# Patient Record
Sex: FEMALE | Race: WHITE | NOT HISPANIC OR LATINO | Employment: OTHER | URBAN - METROPOLITAN AREA
[De-identification: names, ages, dates, MRNs, and addresses within clinical notes are randomized per-mention and may not be internally consistent; named-entity substitution may affect disease eponyms.]

---

## 2022-04-11 ENCOUNTER — HISTORICAL (OUTPATIENT)
Dept: ADMINISTRATIVE | Facility: HOSPITAL | Age: 87
End: 2022-04-11
Payer: MEDICARE

## 2022-04-29 VITALS
DIASTOLIC BLOOD PRESSURE: 89 MMHG | WEIGHT: 148.13 LBS | SYSTOLIC BLOOD PRESSURE: 146 MMHG | BODY MASS INDEX: 25.29 KG/M2 | HEIGHT: 64 IN

## 2022-06-09 DIAGNOSIS — D48.5 NEOPLASM OF UNCERTAIN BEHAVIOR OF SKIN: Primary | ICD-10-CM

## 2022-06-28 DIAGNOSIS — R41.3 MEMORY LOSS: Primary | ICD-10-CM

## 2022-06-28 RX ORDER — DONEPEZIL HYDROCHLORIDE 5 MG/1
5 TABLET, FILM COATED ORAL 2 TIMES DAILY
COMMUNITY
Start: 2021-06-28 | End: 2022-06-28 | Stop reason: SDUPTHER

## 2022-06-28 RX ORDER — DONEPEZIL HYDROCHLORIDE 5 MG/1
5 TABLET, FILM COATED ORAL 2 TIMES DAILY
Qty: 60 TABLET | Refills: 5 | Status: SHIPPED | OUTPATIENT
Start: 2022-06-28 | End: 2023-04-17 | Stop reason: SDUPTHER

## 2022-06-29 NOTE — PROGRESS NOTES
Subjective:       Patient ID: Darby Chavez is a 87 y.o. female.    Chief Complaint: Consult (NPO--Derm Ca-Met Breast cancer)      Diagnosis:  1.  Right-sided breast cancer in the late 1990s/early 2000s status post mastectomy.  Patient unsure of surgeon's name, unsure of receptor status, did not receive chemotherapy or radiation.  2. Breast cancer of the left breast 2 years after initial right breast cancer, also status post mastectomy with no chemo or radiation, details unknown.    3.  Metastatic breast cancer of the skin at the site of her left mastectomy scar diagnosed 06/01/2022    Current Treatment:   1. Pending workup    Treatment History:  1. Right mastectomy in the late 1990s/early 2000s, left mastectomy 2 years later.    HPI  87-year-old female who was diagnosed with right-sided breast cancer in the late 1990s/early 2000s, status post mastectomy of the right breast.  She did not receive any chemo or radiation.  Two years later, she was diagnosed with left-sided breast cancer, also underwent left-sided mastectomy without chemotherapy or radiation.  In 2022, she noticed a nodule/rash at the site of her left mastectomy scar.  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  He was referred Oncology.  I saw her on 07/05/2022.  At that visit, she stated that she has oozing at the site of her metastatic cancer.  She stated that it does not hurt, in only causes problems when she hits it.    Interval History:   Initial consult    Past Medical History:   Diagnosis Date    Anxiety     Arthritis     Breast cancer     CAD (coronary artery disease)     Hyperlipidemia     Hypertension     Mild cognitive impairment     Restless legs syndrome       Past Surgical History:   Procedure Laterality Date    CATARACT EXTRACTION      COLONOSCOPY      CORONARY STENT PLACEMENT      HYSTERECTOMY       MASTECTOMY Bilateral     TONSILLECTOMY      UTERINE SUSPENSION      WRIST SURGERY Right      Social History     Socioeconomic History    Marital status:    Occupational History    Occupation: Retired   Tobacco Use    Smoking status: Former Smoker     Types: Cigarettes    Smokeless tobacco: Never Used   Substance and Sexual Activity    Alcohol use: Not Currently    Drug use: Never      Family History   Problem Relation Age of Onset    Breast cancer Maternal Aunt     Breast cancer Maternal Aunt       Review of patient's allergies indicates:   Allergen Reactions    Sulfa (sulfonamide antibiotics)      Other reaction(s): itching      Review of Systems   Constitutional: Negative for appetite change and unexpected weight change.   HENT: Negative for mouth sores.    Eyes: Negative for visual disturbance.   Respiratory: Negative for cough and shortness of breath.    Cardiovascular: Negative for chest pain.   Gastrointestinal: Negative for abdominal pain and diarrhea.   Genitourinary: Negative for frequency.   Musculoskeletal: Negative for back pain.   Integumentary:  Negative for rash.   Neurological: Negative for headaches.   Hematological: Negative for adenopathy.   Psychiatric/Behavioral: The patient is not nervous/anxious.          Objective:      Physical Exam  Vitals reviewed. Exam conducted with a chaperone present.   Constitutional:       General: She is awake.      Appearance: Normal appearance. She is well-developed.   HENT:      Head: Normocephalic and atraumatic.   Eyes:      General: Lids are normal. Vision grossly intact.      Extraocular Movements: Extraocular movements intact.      Conjunctiva/sclera: Conjunctivae normal.   Cardiovascular:      Rate and Rhythm: Normal rate and regular rhythm.      Pulses: Normal pulses.      Heart sounds: Normal heart sounds.   Pulmonary:      Effort: Pulmonary effort is normal.      Breath sounds: Normal breath sounds.   Chest:      Chest wall: Mass  (Left mastectomy scar with nodule eroding through skin and some oozing consistent with patient's biopsy-proven cancer) present.   Breasts:      Right: No axillary adenopathy or supraclavicular adenopathy.      Left: Skin change present. No supraclavicular adenopathy.       Abdominal:      General: Bowel sounds are normal. There is no distension.      Palpations: Abdomen is soft.      Tenderness: There is no abdominal tenderness.   Musculoskeletal:      Cervical back: Full passive range of motion without pain.      Right lower leg: No edema.      Left lower leg: No edema.   Lymphadenopathy:      Cervical: No cervical adenopathy.      Upper Body:      Right upper body: No supraclavicular, axillary or pectoral adenopathy.      Left upper body: No supraclavicular or pectoral adenopathy.   Skin:     General: Skin is warm and dry.   Neurological:      General: No focal deficit present.      Mental Status: She is alert and oriented to person, place, and time.   Psychiatric:         Attention and Perception: Attention and perception normal.         Behavior: Behavior is cooperative.         LABS AND IMAGING REVIEWED IN EPIC          Assessment:   1. Right-sided breast cancer in the late 1990s/early 2000s status post mastectomy.  Patient unsure of surgeon's name, unsure of receptor status, did not receive chemotherapy or radiation.  2. Breast cancer of the left breast 2 years after initial right breast cancer, also status post mastectomy with no chemo or radiation, details unknown.    3. Metastatic breast cancer of the skin at the site of her left mastectomy scar diagnosed 06/01/2022        Plan:       Patient with history of right-sided breast cancer in the late 1990s/early 2000s with left-sided breast cancer that occurred 2 years later.  She now has recurrence at the site of her left mastectomy scar.    We will send her biopsy specimen for ER/WA and HER2 receptor status.    We will get a PET/CT scan to determine if this is  local recurrence or if she also has diffuse metastatic disease.    The patient is reluctant for treatment, but I explained that if she has HER2 positive disease or hormone receptor positive disease, she may be a candidate for non chemotherapy based treatment.      Also explained that if this is local, she may be a candidate for radiation.    We will have her get a PET/CT scan on return to clinic in 4 weeks.    Kendell Bhatt II, MD

## 2022-07-05 ENCOUNTER — OFFICE VISIT (OUTPATIENT)
Dept: HEMATOLOGY/ONCOLOGY | Facility: CLINIC | Age: 87
End: 2022-07-05
Payer: MEDICARE

## 2022-07-05 VITALS
DIASTOLIC BLOOD PRESSURE: 80 MMHG | HEIGHT: 64 IN | BODY MASS INDEX: 24.84 KG/M2 | WEIGHT: 145.5 LBS | RESPIRATION RATE: 18 BRPM | OXYGEN SATURATION: 98 % | TEMPERATURE: 99 F | SYSTOLIC BLOOD PRESSURE: 146 MMHG | HEART RATE: 83 BPM

## 2022-07-05 DIAGNOSIS — D48.5 NEOPLASM OF UNCERTAIN BEHAVIOR OF SKIN: ICD-10-CM

## 2022-07-05 PROCEDURE — 1101F PR PT FALLS ASSESS DOC 0-1 FALLS W/OUT INJ PAST YR: ICD-10-PCS | Mod: CPTII,S$GLB,, | Performed by: INTERNAL MEDICINE

## 2022-07-05 PROCEDURE — 1159F MED LIST DOCD IN RCRD: CPT | Mod: CPTII,S$GLB,, | Performed by: INTERNAL MEDICINE

## 2022-07-05 PROCEDURE — 3288F FALL RISK ASSESSMENT DOCD: CPT | Mod: CPTII,S$GLB,, | Performed by: INTERNAL MEDICINE

## 2022-07-05 PROCEDURE — 1101F PT FALLS ASSESS-DOCD LE1/YR: CPT | Mod: CPTII,S$GLB,, | Performed by: INTERNAL MEDICINE

## 2022-07-05 PROCEDURE — 99205 OFFICE O/P NEW HI 60 MIN: CPT | Mod: S$GLB,,, | Performed by: INTERNAL MEDICINE

## 2022-07-05 PROCEDURE — 3288F PR FALLS RISK ASSESSMENT DOCUMENTED: ICD-10-PCS | Mod: CPTII,S$GLB,, | Performed by: INTERNAL MEDICINE

## 2022-07-05 PROCEDURE — 1160F RVW MEDS BY RX/DR IN RCRD: CPT | Mod: CPTII,S$GLB,, | Performed by: INTERNAL MEDICINE

## 2022-07-05 PROCEDURE — 1126F PR PAIN SEVERITY QUANTIFIED, NO PAIN PRESENT: ICD-10-PCS | Mod: CPTII,S$GLB,, | Performed by: INTERNAL MEDICINE

## 2022-07-05 PROCEDURE — 99205 PR OFFICE/OUTPT VISIT, NEW, LEVL V, 60-74 MIN: ICD-10-PCS | Mod: S$GLB,,, | Performed by: INTERNAL MEDICINE

## 2022-07-05 PROCEDURE — 99999 PR PBB SHADOW E&M-EST. PATIENT-LVL V: CPT | Mod: PBBFAC,,, | Performed by: INTERNAL MEDICINE

## 2022-07-05 PROCEDURE — 1126F AMNT PAIN NOTED NONE PRSNT: CPT | Mod: CPTII,S$GLB,, | Performed by: INTERNAL MEDICINE

## 2022-07-05 PROCEDURE — 99999 PR PBB SHADOW E&M-EST. PATIENT-LVL V: ICD-10-PCS | Mod: PBBFAC,,, | Performed by: INTERNAL MEDICINE

## 2022-07-05 PROCEDURE — 1159F PR MEDICATION LIST DOCUMENTED IN MEDICAL RECORD: ICD-10-PCS | Mod: CPTII,S$GLB,, | Performed by: INTERNAL MEDICINE

## 2022-07-05 PROCEDURE — 1160F PR REVIEW ALL MEDS BY PRESCRIBER/CLIN PHARMACIST DOCUMENTED: ICD-10-PCS | Mod: CPTII,S$GLB,, | Performed by: INTERNAL MEDICINE

## 2022-07-05 RX ORDER — MONTELUKAST SODIUM 10 MG/1
10 TABLET ORAL DAILY
COMMUNITY
Start: 2022-06-27

## 2022-07-05 RX ORDER — TOLTERODINE TARTRATE 1 MG/1
1 TABLET, EXTENDED RELEASE ORAL 2 TIMES DAILY
COMMUNITY

## 2022-07-05 RX ORDER — PRAVASTATIN SODIUM 20 MG/1
20 TABLET ORAL DAILY
COMMUNITY
Start: 2022-04-27

## 2022-07-05 RX ORDER — ASPIRIN 81 MG/1
81 TABLET ORAL DAILY
COMMUNITY

## 2022-07-05 RX ORDER — OLOPATADINE HYDROCHLORIDE 665 UG/1
SPRAY NASAL
COMMUNITY
Start: 2022-06-02 | End: 2022-08-18

## 2022-07-05 RX ORDER — BETAMETHASONE DIPROPIONATE 0.5 MG/G
1 CREAM TOPICAL 2 TIMES DAILY
COMMUNITY
Start: 2022-05-26 | End: 2022-12-21

## 2022-07-05 RX ORDER — LEVOCETIRIZINE DIHYDROCHLORIDE 5 MG/1
5 TABLET, FILM COATED ORAL DAILY PRN
COMMUNITY
Start: 2022-05-13

## 2022-07-05 RX ORDER — NITROGLYCERIN 0.4 MG/1
TABLET SUBLINGUAL
COMMUNITY
Start: 2022-03-07

## 2022-07-05 RX ORDER — LOSARTAN POTASSIUM 25 MG/1
25 TABLET ORAL DAILY
COMMUNITY
Start: 2022-05-31

## 2022-07-05 RX ORDER — ESOMEPRAZOLE MAGNESIUM 40 MG/1
40 CAPSULE, DELAYED RELEASE ORAL DAILY
COMMUNITY
Start: 2022-05-13

## 2022-07-05 RX ORDER — VENLAFAXINE HYDROCHLORIDE 37.5 MG/1
37.5 CAPSULE, EXTENDED RELEASE ORAL DAILY
COMMUNITY
Start: 2022-04-27

## 2022-07-07 ENCOUNTER — PATIENT MESSAGE (OUTPATIENT)
Dept: HEMATOLOGY/ONCOLOGY | Facility: CLINIC | Age: 87
End: 2022-07-07
Payer: MEDICARE

## 2022-07-08 ENCOUNTER — PATIENT MESSAGE (OUTPATIENT)
Dept: HEMATOLOGY/ONCOLOGY | Facility: CLINIC | Age: 87
End: 2022-07-08
Payer: MEDICARE

## 2022-08-08 ENCOUNTER — HOSPITAL ENCOUNTER (OUTPATIENT)
Dept: RADIOLOGY | Facility: HOSPITAL | Age: 87
Discharge: HOME | End: 2022-08-08
Attending: INTERNAL MEDICINE
Payer: MEDICARE

## 2022-08-08 DIAGNOSIS — D48.5 NEOPLASM OF UNCERTAIN BEHAVIOR OF SKIN: ICD-10-CM

## 2022-08-08 PROCEDURE — 78815 PET IMAGE W/CT SKULL-THIGH: CPT | Mod: PI,TC

## 2022-08-15 ENCOUNTER — OFFICE VISIT (OUTPATIENT)
Dept: NEUROLOGY | Facility: CLINIC | Age: 87
End: 2022-08-15
Payer: MEDICARE

## 2022-08-15 VITALS
HEIGHT: 64 IN | SYSTOLIC BLOOD PRESSURE: 124 MMHG | BODY MASS INDEX: 24.75 KG/M2 | HEART RATE: 84 BPM | WEIGHT: 145 LBS | DIASTOLIC BLOOD PRESSURE: 82 MMHG

## 2022-08-15 DIAGNOSIS — G31.84 MILD COGNITIVE IMPAIRMENT: Primary | ICD-10-CM

## 2022-08-15 PROCEDURE — 1126F PR PAIN SEVERITY QUANTIFIED, NO PAIN PRESENT: ICD-10-PCS | Mod: CPTII,S$GLB,, | Performed by: PSYCHIATRY & NEUROLOGY

## 2022-08-15 PROCEDURE — 99214 PR OFFICE/OUTPT VISIT, EST, LEVL IV, 30-39 MIN: ICD-10-PCS | Mod: S$GLB,,, | Performed by: PSYCHIATRY & NEUROLOGY

## 2022-08-15 PROCEDURE — 99999 PR PBB SHADOW E&M-EST. PATIENT-LVL IV: ICD-10-PCS | Mod: PBBFAC,,, | Performed by: PSYCHIATRY & NEUROLOGY

## 2022-08-15 PROCEDURE — 1159F PR MEDICATION LIST DOCUMENTED IN MEDICAL RECORD: ICD-10-PCS | Mod: CPTII,S$GLB,, | Performed by: PSYCHIATRY & NEUROLOGY

## 2022-08-15 PROCEDURE — 99999 PR PBB SHADOW E&M-EST. PATIENT-LVL IV: CPT | Mod: PBBFAC,,, | Performed by: PSYCHIATRY & NEUROLOGY

## 2022-08-15 PROCEDURE — 1101F PT FALLS ASSESS-DOCD LE1/YR: CPT | Mod: CPTII,S$GLB,, | Performed by: PSYCHIATRY & NEUROLOGY

## 2022-08-15 PROCEDURE — 3288F PR FALLS RISK ASSESSMENT DOCUMENTED: ICD-10-PCS | Mod: CPTII,S$GLB,, | Performed by: PSYCHIATRY & NEUROLOGY

## 2022-08-15 PROCEDURE — 1126F AMNT PAIN NOTED NONE PRSNT: CPT | Mod: CPTII,S$GLB,, | Performed by: PSYCHIATRY & NEUROLOGY

## 2022-08-15 PROCEDURE — 1159F MED LIST DOCD IN RCRD: CPT | Mod: CPTII,S$GLB,, | Performed by: PSYCHIATRY & NEUROLOGY

## 2022-08-15 PROCEDURE — 3288F FALL RISK ASSESSMENT DOCD: CPT | Mod: CPTII,S$GLB,, | Performed by: PSYCHIATRY & NEUROLOGY

## 2022-08-15 PROCEDURE — 99214 OFFICE O/P EST MOD 30 MIN: CPT | Mod: S$GLB,,, | Performed by: PSYCHIATRY & NEUROLOGY

## 2022-08-15 PROCEDURE — 1101F PR PT FALLS ASSESS DOC 0-1 FALLS W/OUT INJ PAST YR: ICD-10-PCS | Mod: CPTII,S$GLB,, | Performed by: PSYCHIATRY & NEUROLOGY

## 2022-08-15 RX ORDER — ASCORBIC ACID 250 MG
250 TABLET ORAL DAILY
COMMUNITY

## 2022-08-15 RX ORDER — VIT C/E/ZN/COPPR/LUTEIN/ZEAXAN 250MG-90MG
1 CAPSULE ORAL DAILY
COMMUNITY

## 2022-08-15 RX ORDER — CALCIUM CARBONATE 500(1250)
1 TABLET ORAL DAILY
COMMUNITY

## 2022-08-15 RX ORDER — CHOLECALCIFEROL (VITAMIN D3) 25 MCG
1000 TABLET ORAL DAILY
COMMUNITY

## 2022-08-15 RX ORDER — LANOLIN ALCOHOL/MO/W.PET/CERES
100 CREAM (GRAM) TOPICAL EVERY OTHER DAY
COMMUNITY

## 2022-08-15 NOTE — PROGRESS NOTES
Chief Complaint   Patient presents with    Mild cognitive impairment     States for her age her memory is pretty good. At times she misplaces things. On an average sleeps at least 6 hours at night. Denies any frustration or agitation.        This is a 87 y.o. female here for follow up for MCI, Here with daughter. She is doing well. She gets out of house twice a week with her son. Other than that she does watch TV most days. No longer driving. On aricept 5 BID and toerlating well. Denies any recent illnesses or infections.         Medication List with Changes/Refills   Current Medications    ASCORBIC ACID, VITAMIN C, (VITAMIN C) 250 MG TABLET    Take 250 mg by mouth once daily.    ASPIRIN (ECOTRIN) 81 MG EC TABLET    Take 81 mg by mouth once daily.    AUGMENTED BETAMETHASONE DIPROPIONATE (DIPROLENE-AF) 0.05 % CREAM    Apply 1 application topically 2 (two) times daily.    CALCIUM CARBONATE (OS-KAREN) 500 MG CALCIUM (1,250 MG) TABLET    Take 1 tablet by mouth once daily.    CYANOCOBALAMIN (VITAMIN B-12) 1000 MCG TABLET    Take 100 mcg by mouth every other day.    DONEPEZIL (ARICEPT) 5 MG TABLET    Take 1 tablet (5 mg total) by mouth 2 (two) times a day.    ESOMEPRAZOLE (NEXIUM) 40 MG CAPSULE    Take 40 mg by mouth once daily.    LEVOCETIRIZINE (XYZAL) 5 MG TABLET    Take 5 mg by mouth daily as needed.    LOSARTAN (COZAAR) 25 MG TABLET    Take 25 mg by mouth once daily.    MONTELUKAST (SINGULAIR) 10 MG TABLET    Take 10 mg by mouth once daily.    MULTIVITAMIN/IRON/FOLIC ACID (CENTRUM COMPLETE ORAL)    Take 1 tablet by mouth Daily.    NITROGLYCERIN (NITROSTAT) 0.4 MG SL TABLET    Place under the tongue.    OLOPATADINE (PATANASE) 0.6 % NASAL SPRAY    by Each Nostril route.    PRAVASTATIN (PRAVACHOL) 20 MG TABLET    Take 20 mg by mouth once daily.    TOLTERODINE (DETROL) 1 MG TAB    Take 1 mg by mouth 2 (two) times daily.    VENLAFAXINE (EFFEXOR-XR) 37.5 MG 24 HR CAPSULE    Take 37.5 mg by mouth once daily.    VIT  C,O-[id number]-LUTEIN-ZEAXAN (PRESERVISION AREDS-2) 250-90-40-1 MG CAP    Take 1 capsule by mouth once daily at 6am.    VITAMIN D (VITAMIN D3) 1000 UNITS TAB    Take 1,000 Units by mouth once daily.        Vitals:    08/15/22 0847   BP: 124/82   Pulse: 84        General: alert and oriented, no acute distress, no audible wheezes, pulse intact, no edema    Cognition and Comprehension  Speech and language intact  Follows commands  Speech fluent  Attention intact  Memory for recent events intact from history taking  Affect pleasant  Fund of knowledge adequate    MOCA 22/30 (21/30)    Cranial nerves  II. Optic: Visual fields full to confrontation both eyes  III, IV, VI. Oculomotor: Intact, Pupils equal, round and reactive to light, no nystagmus  V. Trigeminal: sensation to light touch normal  VII. No facial asymmetry or facial weakness  VIII. Hearing intact to spoken voice  IX/X. Glossopharyngeal/Vagus: Voice normal, palate rises symmetrically  XI. Axillary: Shoulder shrug normal  XII. Hypoglossal: Intact    Muscle Strength and Tone  Normal upper extremity tone  Normal lower extremity tone  Normal upper extremity strength  Normal lower extremity strength    Sensation  Intact to light touch and temperature    Reflexes  Normal and symmetric    Coordination and Gait  Finger to nose normal  Gait normal     1. Mild cognitive impairment  Overview:  Been seeing her since 2018. CT head 2021 chronic ischemia    Assessment & Plan:  Doing well, MOCA stable, cont aricept 5BID

## 2022-08-17 PROBLEM — C79.2 SECONDARY MALIGNANT NEOPLASM OF SKIN: Status: ACTIVE | Noted: 2022-08-17

## 2022-08-17 PROBLEM — C79.51 BREAST CANCER METASTASIZED TO BONE: Status: ACTIVE | Noted: 2022-08-17

## 2022-08-17 PROBLEM — C50.919 BREAST CANCER METASTASIZED TO BONE: Status: ACTIVE | Noted: 2022-08-17

## 2022-08-17 NOTE — PROGRESS NOTES
Subjective:       Patient ID: Darby Chavez is a 87 y.o. female.    Chief Complaint: Follow-up (4 week follow up for PET and path results)      Diagnosis:  1.  Right-sided breast cancer in the late 1990s/early 2000s status post mastectomy.  Patient unsure of surgeon's name, unsure of receptor status, did not receive chemotherapy or radiation.  2. Breast cancer of the left breast 2 years after initial right breast cancer, also status post mastectomy with no chemo or radiation, details unknown.    3.  Metastatic breast cancer of the skin at the site of her left mastectomy scar diagnosed 06/01/2022    Current Treatment:   1. Pending workup    Treatment History:  1. Right mastectomy in the late 1990s/early 2000s, left mastectomy 2 years later.    HPI  Patient who was diagnosed with right-sided breast cancer in the late 1990s/early 2000s, status post mastectomy of the right breast.  She did not receive any chemo or radiation. Two years later, she was diagnosed with left-sided breast cancer, also underwent left-sided mastectomy without chemotherapy or radiation.  In 2022, she noticed a nodule/rash at the site of her left mastectomy scar.  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  He was referred Oncology.  I saw her on 07/05/2022.  At that visit, she stated that she has oozing at the site of her metastatic cancer.  She stated that it does not hurt, in only causes problems when she hits it.  I requested that receptor studies be done on her biopsy, this returned ER/TN positive and HER2 negative. PET/CT scan done on 8/8/2022 revealed hypermetabolic left anterior chest wall mass with hypermetabolic left retropectoral lymph nodes and metastasis at the left 11th costovertebral junction and posterior rib.  No other evidence of metastatic disease.    Interval History:   Patient presents to clinic for  scheduled follow up appointment.  She had her family with her.  She has not had any major issues.  She feels that her chest wall mass has improved.    Past Medical History:   Diagnosis Date    Anxiety     Arthritis     Breast cancer     CAD (coronary artery disease)     Hyperlipidemia     Hypertension     Mild cognitive impairment     Restless legs syndrome       Past Surgical History:   Procedure Laterality Date    CATARACT EXTRACTION      COLONOSCOPY      CORONARY STENT PLACEMENT      HYSTERECTOMY      MASTECTOMY Bilateral     TONSILLECTOMY      UTERINE SUSPENSION      WRIST SURGERY Right      Social History     Socioeconomic History    Marital status:    Occupational History    Occupation: Retired   Tobacco Use    Smoking status: Former Smoker     Types: Cigarettes    Smokeless tobacco: Never Used   Substance and Sexual Activity    Alcohol use: Not Currently    Drug use: Never      Family History   Problem Relation Age of Onset    Breast cancer Maternal Aunt     Breast cancer Maternal Aunt       Review of patient's allergies indicates:   Allergen Reactions    Sulfa (sulfonamide antibiotics)      Other reaction(s): itching      Review of Systems   Constitutional: Negative for appetite change and unexpected weight change.   HENT: Negative for mouth sores.    Eyes: Negative for visual disturbance.   Respiratory: Negative for cough and shortness of breath.    Cardiovascular: Negative for chest pain.   Gastrointestinal: Negative for abdominal pain and diarrhea.   Genitourinary: Negative for frequency.   Musculoskeletal: Negative for back pain.   Integumentary:  Negative for rash.   Neurological: Negative for headaches.   Hematological: Negative for adenopathy.   Psychiatric/Behavioral: The patient is not nervous/anxious.          Objective:      Physical Exam  Vitals reviewed. Exam conducted with a chaperone present.   Constitutional:       General: She is awake.      Appearance: Normal  appearance. She is well-developed.   HENT:      Head: Normocephalic and atraumatic.   Eyes:      General: Lids are normal. Vision grossly intact.      Extraocular Movements: Extraocular movements intact.      Conjunctiva/sclera: Conjunctivae normal.   Cardiovascular:      Rate and Rhythm: Normal rate and regular rhythm.      Pulses: Normal pulses.      Heart sounds: Normal heart sounds.   Pulmonary:      Effort: Pulmonary effort is normal.      Breath sounds: Normal breath sounds.   Chest:      Chest wall: Mass (Left mastectomy scar with nodule eroding through skin and some oozing consistent with patient's biopsy-proven cancer) present.   Breasts:      Right: No axillary adenopathy or supraclavicular adenopathy.      Left: Skin change present. No supraclavicular adenopathy.       Abdominal:      General: Bowel sounds are normal. There is no distension.      Palpations: Abdomen is soft.      Tenderness: There is no abdominal tenderness.   Musculoskeletal:      Cervical back: Full passive range of motion without pain.      Right lower leg: No edema.      Left lower leg: No edema.   Lymphadenopathy:      Cervical: No cervical adenopathy.      Upper Body:      Right upper body: No supraclavicular, axillary or pectoral adenopathy.      Left upper body: No supraclavicular or pectoral adenopathy.   Skin:     General: Skin is warm and dry.   Neurological:      General: No focal deficit present.      Mental Status: She is alert and oriented to person, place, and time.   Psychiatric:         Attention and Perception: Attention and perception normal.         Behavior: Behavior is cooperative.         LABS AND IMAGING REVIEWED IN EPIC          Assessment:     1. Right-sided breast cancer in the late 1990s/early 2000s status post mastectomy.  Patient unsure of surgeon's name, unsure of receptor status, did not receive chemotherapy or radiation.  2. Breast cancer of the left breast 2 years after initial right breast cancer, also  status post mastectomy with no chemo or radiation, details unknown.    3. Metastatic breast cancer of the skin at the site of her left mastectomy scar diagnosed 06/01/2022, ER/MO positive, HER2 negative        Plan:       Patient with history of right-sided breast cancer in the late 1990s/early 2000s with left-sided breast cancer that occurred 2 years later.  She now has recurrence at the site of her left mastectomy scar.    This returned ER/MO positive and HER2 negative.    PET/CT scan done on 08/08/2022 revealed left anterior chest wall mass with left retropectoral nodes and a left 11th rib lesion.  No other metastatic disease.    I strongly recommended that she see Radiation Oncology for radiation to these areas followed by starting an aromatase inhibitor due to hormone receptor positivity.      We could consider a CDK4/6 inhibitor, but would want to make sure she tolerated an AI 1st.      Will send referral to home health for wound care consult    Will send referral to radiation oncology, Dr. Marzena Pablo.  I will see her 6 weeks later.    DEXA scan ordered    Return to clinic in 6 weeks    Kendell Bhatt II, MD      I, Thais Ramos LPN, acted solely as a scribe for and in the presence of, Dr. Kendell Bhatt who performed the service.

## 2022-08-18 ENCOUNTER — OFFICE VISIT (OUTPATIENT)
Dept: HEMATOLOGY/ONCOLOGY | Facility: CLINIC | Age: 87
End: 2022-08-18
Payer: MEDICARE

## 2022-08-18 VITALS
DIASTOLIC BLOOD PRESSURE: 94 MMHG | HEIGHT: 64 IN | OXYGEN SATURATION: 97 % | TEMPERATURE: 99 F | BODY MASS INDEX: 24.69 KG/M2 | HEART RATE: 79 BPM | WEIGHT: 144.63 LBS | RESPIRATION RATE: 18 BRPM | SYSTOLIC BLOOD PRESSURE: 151 MMHG

## 2022-08-18 DIAGNOSIS — C50.919 CARCINOMA OF BREAST METASTATIC TO BONE, UNSPECIFIED LATERALITY: ICD-10-CM

## 2022-08-18 DIAGNOSIS — C79.51 CARCINOMA OF BREAST METASTATIC TO BONE, UNSPECIFIED LATERALITY: ICD-10-CM

## 2022-08-18 DIAGNOSIS — Z92.21 PRIOR AROMATASE INHIBITOR THERAPY: ICD-10-CM

## 2022-08-18 DIAGNOSIS — Z78.0 POST-MENOPAUSE: ICD-10-CM

## 2022-08-18 DIAGNOSIS — C79.2 SECONDARY MALIGNANT NEOPLASM OF SKIN: ICD-10-CM

## 2022-08-18 PROCEDURE — 1160F PR REVIEW ALL MEDS BY PRESCRIBER/CLIN PHARMACIST DOCUMENTED: ICD-10-PCS | Mod: CPTII,S$GLB,, | Performed by: INTERNAL MEDICINE

## 2022-08-18 PROCEDURE — 1160F RVW MEDS BY RX/DR IN RCRD: CPT | Mod: CPTII,S$GLB,, | Performed by: INTERNAL MEDICINE

## 2022-08-18 PROCEDURE — 99214 PR OFFICE/OUTPT VISIT, EST, LEVL IV, 30-39 MIN: ICD-10-PCS | Mod: S$GLB,,, | Performed by: INTERNAL MEDICINE

## 2022-08-18 PROCEDURE — 3288F FALL RISK ASSESSMENT DOCD: CPT | Mod: CPTII,S$GLB,, | Performed by: INTERNAL MEDICINE

## 2022-08-18 PROCEDURE — 99999 PR PBB SHADOW E&M-EST. PATIENT-LVL V: ICD-10-PCS | Mod: PBBFAC,,, | Performed by: INTERNAL MEDICINE

## 2022-08-18 PROCEDURE — 1126F AMNT PAIN NOTED NONE PRSNT: CPT | Mod: CPTII,S$GLB,, | Performed by: INTERNAL MEDICINE

## 2022-08-18 PROCEDURE — 1101F PR PT FALLS ASSESS DOC 0-1 FALLS W/OUT INJ PAST YR: ICD-10-PCS | Mod: CPTII,S$GLB,, | Performed by: INTERNAL MEDICINE

## 2022-08-18 PROCEDURE — 1101F PT FALLS ASSESS-DOCD LE1/YR: CPT | Mod: CPTII,S$GLB,, | Performed by: INTERNAL MEDICINE

## 2022-08-18 PROCEDURE — 1159F PR MEDICATION LIST DOCUMENTED IN MEDICAL RECORD: ICD-10-PCS | Mod: CPTII,S$GLB,, | Performed by: INTERNAL MEDICINE

## 2022-08-18 PROCEDURE — 99999 PR PBB SHADOW E&M-EST. PATIENT-LVL V: CPT | Mod: PBBFAC,,, | Performed by: INTERNAL MEDICINE

## 2022-08-18 PROCEDURE — 1126F PR PAIN SEVERITY QUANTIFIED, NO PAIN PRESENT: ICD-10-PCS | Mod: CPTII,S$GLB,, | Performed by: INTERNAL MEDICINE

## 2022-08-18 PROCEDURE — 1159F MED LIST DOCD IN RCRD: CPT | Mod: CPTII,S$GLB,, | Performed by: INTERNAL MEDICINE

## 2022-08-18 PROCEDURE — 99214 OFFICE O/P EST MOD 30 MIN: CPT | Mod: S$GLB,,, | Performed by: INTERNAL MEDICINE

## 2022-08-18 PROCEDURE — 3288F PR FALLS RISK ASSESSMENT DOCUMENTED: ICD-10-PCS | Mod: CPTII,S$GLB,, | Performed by: INTERNAL MEDICINE

## 2022-08-19 ENCOUNTER — TELEPHONE (OUTPATIENT)
Dept: HEMATOLOGY/ONCOLOGY | Facility: CLINIC | Age: 87
End: 2022-08-19
Payer: MEDICARE

## 2022-08-19 NOTE — TELEPHONE ENCOUNTER
Patient is refusing wound care / home health services. She informed [facility] that she did not need their services.

## 2022-08-25 ENCOUNTER — OFFICE VISIT (OUTPATIENT)
Dept: RADIATION THERAPY | Facility: HOSPITAL | Age: 87
End: 2022-08-25
Attending: RADIOLOGY
Payer: MEDICARE

## 2022-08-25 DIAGNOSIS — C50.919 CARCINOMA OF BREAST METASTATIC TO BONE, UNSPECIFIED LATERALITY: ICD-10-CM

## 2022-08-25 DIAGNOSIS — C79.51 CARCINOMA OF BREAST METASTATIC TO BONE, UNSPECIFIED LATERALITY: ICD-10-CM

## 2022-08-25 DIAGNOSIS — C79.2 SECONDARY MALIGNANT NEOPLASM OF SKIN: ICD-10-CM

## 2022-08-25 PROCEDURE — 77290 THER RAD SIMULAJ FIELD CPLX: CPT | Performed by: RADIOLOGY

## 2022-08-25 PROCEDURE — 77334 RADIATION TREATMENT AID(S): CPT | Performed by: RADIOLOGY

## 2022-08-31 PROCEDURE — 77300 RADIATION THERAPY DOSE PLAN: CPT | Performed by: RADIOLOGY

## 2022-08-31 PROCEDURE — 77334 RADIATION TREATMENT AID(S): CPT | Performed by: RADIOLOGY

## 2022-09-01 PROCEDURE — 77295 3-D RADIOTHERAPY PLAN: CPT | Performed by: RADIOLOGY

## 2022-09-06 ENCOUNTER — APPOINTMENT (OUTPATIENT)
Dept: RADIATION THERAPY | Facility: HOSPITAL | Age: 87
End: 2022-09-06
Attending: RADIOLOGY
Payer: MEDICARE

## 2022-09-06 PROCEDURE — 77280 THER RAD SIMULAJ FIELD SMPL: CPT | Performed by: RADIOLOGY

## 2022-09-06 PROCEDURE — 77412 RADIATION TX DELIVERY LVL 3: CPT | Performed by: RADIOLOGY

## 2022-09-07 PROCEDURE — 77412 RADIATION TX DELIVERY LVL 3: CPT | Performed by: RADIOLOGY

## 2022-09-07 PROCEDURE — 77387 GUIDANCE FOR RADJ TX DLVR: CPT | Performed by: RADIOLOGY

## 2022-09-08 PROCEDURE — 77387 GUIDANCE FOR RADJ TX DLVR: CPT | Performed by: RADIOLOGY

## 2022-09-08 PROCEDURE — 77412 RADIATION TX DELIVERY LVL 3: CPT | Performed by: RADIOLOGY

## 2022-09-09 PROCEDURE — 77412 RADIATION TX DELIVERY LVL 3: CPT | Performed by: RADIOLOGY

## 2022-09-09 PROCEDURE — 77387 GUIDANCE FOR RADJ TX DLVR: CPT | Performed by: RADIOLOGY

## 2022-09-12 PROCEDURE — 77387 GUIDANCE FOR RADJ TX DLVR: CPT | Performed by: RADIOLOGY

## 2022-09-12 PROCEDURE — 77336 RADIATION PHYSICS CONSULT: CPT | Performed by: RADIOLOGY

## 2022-09-12 PROCEDURE — 77412 RADIATION TX DELIVERY LVL 3: CPT | Performed by: RADIOLOGY

## 2022-09-13 PROCEDURE — 77387 GUIDANCE FOR RADJ TX DLVR: CPT | Performed by: RADIOLOGY

## 2022-09-13 PROCEDURE — 77412 RADIATION TX DELIVERY LVL 3: CPT | Performed by: RADIOLOGY

## 2022-09-14 PROCEDURE — 77387 GUIDANCE FOR RADJ TX DLVR: CPT | Performed by: RADIOLOGY

## 2022-09-14 PROCEDURE — 77412 RADIATION TX DELIVERY LVL 3: CPT | Performed by: RADIOLOGY

## 2022-09-15 PROCEDURE — 77387 GUIDANCE FOR RADJ TX DLVR: CPT | Performed by: RADIOLOGY

## 2022-09-15 PROCEDURE — 77412 RADIATION TX DELIVERY LVL 3: CPT | Performed by: RADIOLOGY

## 2022-09-16 PROCEDURE — 77387 GUIDANCE FOR RADJ TX DLVR: CPT | Performed by: RADIOLOGY

## 2022-09-16 PROCEDURE — 77412 RADIATION TX DELIVERY LVL 3: CPT | Performed by: RADIOLOGY

## 2022-09-19 PROCEDURE — 77412 RADIATION TX DELIVERY LVL 3: CPT | Performed by: RADIOLOGY

## 2022-09-19 PROCEDURE — 77387 GUIDANCE FOR RADJ TX DLVR: CPT | Performed by: RADIOLOGY

## 2022-09-19 PROCEDURE — 77336 RADIATION PHYSICS CONSULT: CPT | Performed by: RADIOLOGY

## 2022-09-29 ENCOUNTER — OFFICE VISIT (OUTPATIENT)
Dept: HEMATOLOGY/ONCOLOGY | Facility: CLINIC | Age: 87
End: 2022-09-29
Payer: MEDICARE

## 2022-09-29 VITALS
HEIGHT: 64 IN | BODY MASS INDEX: 25.11 KG/M2 | DIASTOLIC BLOOD PRESSURE: 89 MMHG | TEMPERATURE: 98 F | OXYGEN SATURATION: 98 % | HEART RATE: 83 BPM | SYSTOLIC BLOOD PRESSURE: 148 MMHG | WEIGHT: 147.06 LBS | RESPIRATION RATE: 18 BRPM

## 2022-09-29 DIAGNOSIS — C50.912 CARCINOMA OF LEFT BREAST METASTATIC TO BONE: Primary | ICD-10-CM

## 2022-09-29 DIAGNOSIS — C79.51 CARCINOMA OF LEFT BREAST METASTATIC TO BONE: Primary | ICD-10-CM

## 2022-09-29 PROBLEM — E78.5 HYPERLIPIDEMIA: Status: ACTIVE | Noted: 2022-09-29

## 2022-09-29 PROBLEM — C50.919 MALIGNANT NEOPLASM OF BREAST: Status: ACTIVE | Noted: 2022-09-29

## 2022-09-29 PROBLEM — I10 HYPERTENSION: Status: ACTIVE | Noted: 2022-09-29

## 2022-09-29 PROBLEM — I21.9 MYOCARDIAL INFARCTION: Status: ACTIVE | Noted: 2022-09-29

## 2022-09-29 PROBLEM — F09 MILD COGNITIVE DISORDER: Status: ACTIVE | Noted: 2022-09-29

## 2022-09-29 PROCEDURE — 99215 PR OFFICE/OUTPT VISIT, EST, LEVL V, 40-54 MIN: ICD-10-PCS | Mod: S$GLB,,, | Performed by: NURSE PRACTITIONER

## 2022-09-29 PROCEDURE — 99999 PR PBB SHADOW E&M-EST. PATIENT-LVL V: CPT | Mod: PBBFAC,,, | Performed by: NURSE PRACTITIONER

## 2022-09-29 PROCEDURE — 1160F PR REVIEW ALL MEDS BY PRESCRIBER/CLIN PHARMACIST DOCUMENTED: ICD-10-PCS | Mod: CPTII,S$GLB,, | Performed by: NURSE PRACTITIONER

## 2022-09-29 PROCEDURE — 99215 OFFICE O/P EST HI 40 MIN: CPT | Mod: S$GLB,,, | Performed by: NURSE PRACTITIONER

## 2022-09-29 PROCEDURE — 3288F FALL RISK ASSESSMENT DOCD: CPT | Mod: CPTII,S$GLB,, | Performed by: NURSE PRACTITIONER

## 2022-09-29 PROCEDURE — 1101F PT FALLS ASSESS-DOCD LE1/YR: CPT | Mod: CPTII,S$GLB,, | Performed by: NURSE PRACTITIONER

## 2022-09-29 PROCEDURE — 1126F PR PAIN SEVERITY QUANTIFIED, NO PAIN PRESENT: ICD-10-PCS | Mod: CPTII,S$GLB,, | Performed by: NURSE PRACTITIONER

## 2022-09-29 PROCEDURE — 1159F MED LIST DOCD IN RCRD: CPT | Mod: CPTII,S$GLB,, | Performed by: NURSE PRACTITIONER

## 2022-09-29 PROCEDURE — 1126F AMNT PAIN NOTED NONE PRSNT: CPT | Mod: CPTII,S$GLB,, | Performed by: NURSE PRACTITIONER

## 2022-09-29 PROCEDURE — 1101F PR PT FALLS ASSESS DOC 0-1 FALLS W/OUT INJ PAST YR: ICD-10-PCS | Mod: CPTII,S$GLB,, | Performed by: NURSE PRACTITIONER

## 2022-09-29 PROCEDURE — 1159F PR MEDICATION LIST DOCUMENTED IN MEDICAL RECORD: ICD-10-PCS | Mod: CPTII,S$GLB,, | Performed by: NURSE PRACTITIONER

## 2022-09-29 PROCEDURE — 99999 PR PBB SHADOW E&M-EST. PATIENT-LVL V: ICD-10-PCS | Mod: PBBFAC,,, | Performed by: NURSE PRACTITIONER

## 2022-09-29 PROCEDURE — 3288F PR FALLS RISK ASSESSMENT DOCUMENTED: ICD-10-PCS | Mod: CPTII,S$GLB,, | Performed by: NURSE PRACTITIONER

## 2022-09-29 PROCEDURE — 1160F RVW MEDS BY RX/DR IN RCRD: CPT | Mod: CPTII,S$GLB,, | Performed by: NURSE PRACTITIONER

## 2022-09-29 RX ORDER — ANASTROZOLE 1 MG/1
1 TABLET ORAL DAILY
Qty: 30 TABLET | Refills: 2 | Status: SHIPPED | OUTPATIENT
Start: 2022-09-29 | End: 2022-12-27 | Stop reason: SDUPTHER

## 2022-09-29 NOTE — PROGRESS NOTES
Subjective:       Patient ID: Darby Chavez is a 87 y.o. female.    Chief Complaint: Follow-up (6 week review Dexa)      Diagnosis:  1.  Right-sided breast cancer in the late 1990s/early 2000s status post mastectomy.  Patient unsure of surgeon's name, unsure of receptor status, did not receive chemotherapy or radiation.  2. Breast cancer of the left breast 2 years after initial right breast cancer, also status post mastectomy with no chemo or radiation, details unknown.    3.  Metastatic breast cancer of the skin at the site of her left mastectomy scar diagnosed 06/01/2022    Current Treatment:   1. Pending workup    Treatment History:  1. Right mastectomy in the late 1990s/early 2000s, left mastectomy 2 years later.    HPI  Patient who was diagnosed with right-sided breast cancer in the late 1990s/early 2000s, status post mastectomy of the right breast.  She did not receive any chemo or radiation. Two years later, she was diagnosed with left-sided breast cancer, also underwent left-sided mastectomy without chemotherapy or radiation.  In 2022, she noticed a nodule/rash at the site of her left mastectomy scar.  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  He was referred Oncology.  I saw her on 07/05/2022.  At that visit, she stated that she has oozing at the site of her metastatic cancer.  She stated that it does not hurt, in only causes problems when she hits it.  I requested that receptor studies be done on her biopsy, this returned ER/MI positive and HER2 negative. PET/CT scan done on 8/8/2022 revealed hypermetabolic left anterior chest wall mass with hypermetabolic left retropectoral lymph nodes and metastasis at the left 11th costovertebral junction and posterior rib.  No other evidence of metastatic disease.    Interval History:   Patient presents to clinic for scheduled follow up  appointment.  She had her family with her.  Since her last visit she completed XRT. She has not had any major issues other than wound/radiation burn to the left chest wall.       Past Medical History:   Diagnosis Date    Anxiety     Arthritis     Breast cancer     CAD (coronary artery disease)     Hyperlipidemia     Hypertension     Mild cognitive impairment     Restless legs syndrome       Past Surgical History:   Procedure Laterality Date    CATARACT EXTRACTION      COLONOSCOPY      CORONARY STENT PLACEMENT      HYSTERECTOMY      MASTECTOMY Bilateral     TONSILLECTOMY      UTERINE SUSPENSION      WRIST SURGERY Right      Social History     Socioeconomic History    Marital status:    Occupational History    Occupation: Retired   Tobacco Use    Smoking status: Former     Types: Cigarettes    Smokeless tobacco: Never   Substance and Sexual Activity    Alcohol use: Not Currently    Drug use: Never      Family History   Problem Relation Age of Onset    Breast cancer Maternal Aunt     Breast cancer Maternal Aunt       Review of patient's allergies indicates:   Allergen Reactions    Sulfa (sulfonamide antibiotics)      Other reaction(s): itching      Review of Systems   Constitutional:  Negative for appetite change and unexpected weight change.   HENT:  Negative for mouth sores.    Eyes:  Negative for visual disturbance.   Respiratory:  Negative for cough and shortness of breath.    Cardiovascular:  Negative for chest pain.   Gastrointestinal:  Negative for abdominal pain and diarrhea.   Genitourinary:  Negative for frequency.   Musculoskeletal:  Negative for back pain.   Integumentary:  Positive for wound. Negative for rash.   Neurological:  Negative for headaches.   Hematological:  Negative for adenopathy.   Psychiatric/Behavioral:  The patient is not nervous/anxious.        Objective:      Physical Exam  Vitals reviewed.   Constitutional:       General: She is awake.      Appearance: Normal appearance. She is  well-developed.   HENT:      Head: Normocephalic and atraumatic.   Eyes:      General: Lids are normal. Vision grossly intact.      Extraocular Movements: Extraocular movements intact.      Conjunctiva/sclera: Conjunctivae normal.   Cardiovascular:      Rate and Rhythm: Normal rate and regular rhythm.      Pulses: Normal pulses.      Heart sounds: Normal heart sounds.   Pulmonary:      Effort: Pulmonary effort is normal.      Breath sounds: Normal breath sounds.   Chest:   Breasts:     Left: Skin change present.      Comments: Left chest small with a wound central to her scar also with radiation changes to the skin.  Small amount of yellow discharge  Musculoskeletal:      Cervical back: Full passive range of motion without pain.      Right lower leg: No edema.      Left lower leg: No edema.   Lymphadenopathy:      Cervical: No cervical adenopathy.   Skin:     General: Skin is warm and dry.   Neurological:      General: No focal deficit present.      Mental Status: She is alert and oriented to person, place, and time.   Psychiatric:         Attention and Perception: Attention and perception normal.         Behavior: Behavior is cooperative.       LABS REVIEWED IN [facility]          Assessment:     1. Right-sided breast cancer in the late 1990s/early 2000s status post mastectomy.  Patient unsure of surgeon's name, unsure of receptor status, did not receive chemotherapy or radiation.  2. Breast cancer of the left breast 2 years after initial right breast cancer, also status post mastectomy with no chemo or radiation, details unknown.    3. Metastatic breast cancer of the skin at the site of her left mastectomy scar diagnosed 06/01/2022, ER/MD positive, HER2 negative        Plan:       Patient with history of right-sided breast cancer in the late 1990s/early 2000s with left-sided breast cancer that occurred 2 years later.  She now has recurrence at the site of her left mastectomy scar.    This returned ER/MD positive and  HER2 negative.    PET/CT scan done on 08/08/2022 revealed left anterior chest wall mass with left retropectoral nodes and a left 11th rib lesion.  No other metastatic disease.    She has seen Radiation Oncology for radiation and completed this about 1 week ago.tWill also start aromatase inhibitor due to hormone receptor positivity.      Discussed the use of aromatase inhibitors including mechanism of action, duration of treatment, and potential side effects including hot flashes, night sweats, mood swings, vaginal dryness, and accelerated bone loss. New prescription sent for anastrozole.     We could consider a CDK4/6 inhibitor, but would want to make sure she tolerated an AI 1st.      DEXA scan scheduled for 10/11/22    Return to clinic in 6 weeks    PETEY Gomez

## 2022-10-11 ENCOUNTER — HOSPITAL ENCOUNTER (OUTPATIENT)
Dept: RADIOLOGY | Facility: HOSPITAL | Age: 87
Discharge: HOME | End: 2022-10-11
Attending: INTERNAL MEDICINE
Payer: MEDICARE

## 2022-10-11 DIAGNOSIS — C79.51 CARCINOMA OF BREAST METASTATIC TO BONE, UNSPECIFIED LATERALITY: ICD-10-CM

## 2022-10-11 DIAGNOSIS — C50.919 CARCINOMA OF BREAST METASTATIC TO BONE, UNSPECIFIED LATERALITY: ICD-10-CM

## 2022-10-11 DIAGNOSIS — Z92.21 PRIOR AROMATASE INHIBITOR THERAPY: ICD-10-CM

## 2022-10-11 DIAGNOSIS — Z78.0 POST-MENOPAUSE: ICD-10-CM

## 2022-10-11 DIAGNOSIS — C79.2 SECONDARY MALIGNANT NEOPLASM OF SKIN: ICD-10-CM

## 2022-10-11 PROCEDURE — 77080 DXA BONE DENSITY AXIAL: CPT | Mod: TC

## 2022-11-01 ENCOUNTER — TELEPHONE (OUTPATIENT)
Dept: HEMATOLOGY/ONCOLOGY | Facility: CLINIC | Age: 87
End: 2022-11-01
Payer: MEDICARE

## 2022-11-10 ENCOUNTER — OFFICE VISIT (OUTPATIENT)
Dept: HEMATOLOGY/ONCOLOGY | Facility: CLINIC | Age: 87
End: 2022-11-10
Payer: MEDICARE

## 2022-11-10 VITALS
TEMPERATURE: 98 F | WEIGHT: 151.69 LBS | HEART RATE: 83 BPM | BODY MASS INDEX: 25.9 KG/M2 | RESPIRATION RATE: 18 BRPM | HEIGHT: 64 IN | DIASTOLIC BLOOD PRESSURE: 85 MMHG | OXYGEN SATURATION: 99 % | SYSTOLIC BLOOD PRESSURE: 144 MMHG

## 2022-11-10 DIAGNOSIS — C50.919 MALIGNANT NEOPLASM OF BREAST IN FEMALE, ESTROGEN RECEPTOR POSITIVE, UNSPECIFIED LATERALITY, UNSPECIFIED SITE OF BREAST: ICD-10-CM

## 2022-11-10 DIAGNOSIS — M85.80 OSTEOPENIA, UNSPECIFIED LOCATION: ICD-10-CM

## 2022-11-10 DIAGNOSIS — Z17.0 MALIGNANT NEOPLASM OF BREAST IN FEMALE, ESTROGEN RECEPTOR POSITIVE, UNSPECIFIED LATERALITY, UNSPECIFIED SITE OF BREAST: ICD-10-CM

## 2022-11-10 DIAGNOSIS — C79.2 SECONDARY MALIGNANT NEOPLASM OF SKIN: Primary | ICD-10-CM

## 2022-11-10 PROCEDURE — 1101F PR PT FALLS ASSESS DOC 0-1 FALLS W/OUT INJ PAST YR: ICD-10-PCS | Mod: CPTII,S$GLB,, | Performed by: INTERNAL MEDICINE

## 2022-11-10 PROCEDURE — 1160F RVW MEDS BY RX/DR IN RCRD: CPT | Mod: CPTII,S$GLB,, | Performed by: INTERNAL MEDICINE

## 2022-11-10 PROCEDURE — 99214 PR OFFICE/OUTPT VISIT, EST, LEVL IV, 30-39 MIN: ICD-10-PCS | Mod: S$GLB,,, | Performed by: INTERNAL MEDICINE

## 2022-11-10 PROCEDURE — 99214 OFFICE O/P EST MOD 30 MIN: CPT | Mod: S$GLB,,, | Performed by: INTERNAL MEDICINE

## 2022-11-10 PROCEDURE — 1160F PR REVIEW ALL MEDS BY PRESCRIBER/CLIN PHARMACIST DOCUMENTED: ICD-10-PCS | Mod: CPTII,S$GLB,, | Performed by: INTERNAL MEDICINE

## 2022-11-10 PROCEDURE — 99999 PR PBB SHADOW E&M-EST. PATIENT-LVL V: ICD-10-PCS | Mod: PBBFAC,,, | Performed by: INTERNAL MEDICINE

## 2022-11-10 PROCEDURE — 1159F MED LIST DOCD IN RCRD: CPT | Mod: CPTII,S$GLB,, | Performed by: INTERNAL MEDICINE

## 2022-11-10 PROCEDURE — 1101F PT FALLS ASSESS-DOCD LE1/YR: CPT | Mod: CPTII,S$GLB,, | Performed by: INTERNAL MEDICINE

## 2022-11-10 PROCEDURE — 1126F AMNT PAIN NOTED NONE PRSNT: CPT | Mod: CPTII,S$GLB,, | Performed by: INTERNAL MEDICINE

## 2022-11-10 PROCEDURE — 3288F FALL RISK ASSESSMENT DOCD: CPT | Mod: CPTII,S$GLB,, | Performed by: INTERNAL MEDICINE

## 2022-11-10 PROCEDURE — 99999 PR PBB SHADOW E&M-EST. PATIENT-LVL V: CPT | Mod: PBBFAC,,, | Performed by: INTERNAL MEDICINE

## 2022-11-10 PROCEDURE — 3288F PR FALLS RISK ASSESSMENT DOCUMENTED: ICD-10-PCS | Mod: CPTII,S$GLB,, | Performed by: INTERNAL MEDICINE

## 2022-11-10 PROCEDURE — 1126F PR PAIN SEVERITY QUANTIFIED, NO PAIN PRESENT: ICD-10-PCS | Mod: CPTII,S$GLB,, | Performed by: INTERNAL MEDICINE

## 2022-11-10 PROCEDURE — 1159F PR MEDICATION LIST DOCUMENTED IN MEDICAL RECORD: ICD-10-PCS | Mod: CPTII,S$GLB,, | Performed by: INTERNAL MEDICINE

## 2022-11-10 NOTE — PROGRESS NOTES
Subjective:       Patient ID: Darby Chavez is a 87 y.o. female.    Chief Complaint: Follow-up (Patient stated no new problems )      Diagnosis:  1.  Right-sided breast cancer in the late 1990s/early 2000s status post mastectomy.  Patient unsure of surgeon's name, unsure of receptor status, did not receive chemotherapy or radiation.  2. Breast cancer of the left breast 2 years after initial right breast cancer, also status post mastectomy with no chemo or radiation, details unknown.    3.  Metastatic breast cancer of the skin at the site of her left mastectomy scar diagnosed 06/01/2022    Current Treatment:   1. Anastrozole     Treatment History:  1. Right mastectomy in the late 1990s/early 2000s, left mastectomy 2 years later.    HPI:  Patient who was diagnosed with right-sided breast cancer in the late 1990s/early 2000s, status post mastectomy of the right breast.  She did not receive any chemo or radiation. Two years later, she was diagnosed with left-sided breast cancer, also underwent left-sided mastectomy without chemotherapy or radiation.  In 2022, she noticed a nodule/rash at the site of her left mastectomy scar.  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  He was referred Oncology.  I saw her on 07/05/2022.  At that visit, she stated that she has oozing at the site of her metastatic cancer.  She stated that it does not hurt, in only causes problems when she hits it.  I requested that receptor studies be done on her biopsy, this returned ER/NM positive and HER2 negative. PET/CT scan done on 8/8/2022 revealed hypermetabolic left anterior chest wall mass with hypermetabolic left retropectoral lymph nodes and metastasis at the left 11th costovertebral junction and posterior rib.  No other evidence of metastatic disease.  Completed radiation on 09/19/2022.  DEXA scan done on  10/11/2022 showed osteopenia.       Interval History:   Patient presents to clinic for scheduled follow up appointment.  She had her family with her.  She is tolerating anastrozole very well.  She has not noticed any side effects at all.  She does state that she has dentures, and these dentures sit on her bone.      Past Medical History:   Diagnosis Date    Anxiety     Arthritis     Breast cancer     CAD (coronary artery disease)     Hyperlipidemia     Hypertension     Mild cognitive impairment     Restless legs syndrome       Past Surgical History:   Procedure Laterality Date    BREAST SURGERY  2000    CATARACT EXTRACTION      COLONOSCOPY      CORONARY STENT PLACEMENT      EYE SURGERY  2018    HYSTERECTOMY      MASTECTOMY Bilateral     TONSILLECTOMY      UTERINE SUSPENSION      WRIST SURGERY Right      Social History     Socioeconomic History    Marital status:    Occupational History    Occupation: Retired   Tobacco Use    Smoking status: Former     Packs/day: 1.00     Years: 10.00     Pack years: 10.00     Types: Cigarettes    Smokeless tobacco: Never   Substance and Sexual Activity    Alcohol use: Not Currently    Drug use: Never      Family History   Problem Relation Age of Onset    Breast cancer Maternal Aunt     Breast cancer Maternal Aunt     Heart disease Father       Review of patient's allergies indicates:   Allergen Reactions    Sulfa (sulfonamide antibiotics)      Other reaction(s): itching      Review of Systems   Constitutional:  Negative for appetite change and unexpected weight change.   HENT:  Negative for mouth sores.    Eyes:  Negative for visual disturbance.   Respiratory:  Negative for cough and shortness of breath.    Cardiovascular:  Negative for chest pain.   Gastrointestinal:  Negative for abdominal pain and diarrhea.   Genitourinary:  Negative for frequency.   Musculoskeletal:  Negative for back pain.   Integumentary:  Negative for rash.   Neurological:  Negative for headaches.    Hematological:  Negative for adenopathy.   Psychiatric/Behavioral:  The patient is not nervous/anxious.        Objective:      Physical Exam  Vitals reviewed.   Constitutional:       General: She is awake.      Appearance: Normal appearance. She is well-developed.   HENT:      Head: Normocephalic and atraumatic.   Eyes:      General: Lids are normal. Vision grossly intact.      Extraocular Movements: Extraocular movements intact.      Conjunctiva/sclera: Conjunctivae normal.   Cardiovascular:      Rate and Rhythm: Normal rate and regular rhythm.      Pulses: Normal pulses.      Heart sounds: Normal heart sounds.   Pulmonary:      Effort: Pulmonary effort is normal.      Breath sounds: Normal breath sounds.   Chest:   Breasts:     Left: Skin change present.      Comments: Left chest with very well healed area where radiation occurred due to malignancy  Musculoskeletal:      Cervical back: Full passive range of motion without pain.      Right lower leg: No edema.      Left lower leg: No edema.   Lymphadenopathy:      Cervical: No cervical adenopathy.   Skin:     General: Skin is warm and dry.   Neurological:      General: No focal deficit present.      Mental Status: She is alert and oriented to person, place, and time.   Psychiatric:         Attention and Perception: Attention and perception normal.         Behavior: Behavior is cooperative.       LABS REVIEWED IN [facility]          Assessment:     1. Right-sided breast cancer in the late 1990s/early 2000s status post mastectomy.  Patient unsure of surgeon's name, unsure of receptor status, did not receive chemotherapy or radiation.  2. Breast cancer of the left breast 2 years after initial right breast cancer, also status post mastectomy with no chemo or radiation, details unknown.    3. Metastatic breast cancer of the skin at the site of her left mastectomy scar diagnosed 06/01/2022, ER/DC positive, HER2 negative  4. Osteopenia        Plan:       Patient with history  of right-sided breast cancer in the late 1990s/early 2000s with left-sided breast cancer that occurred 2 years later.  She now has recurrence at the site of her left mastectomy scar.    This returned ER/IN positive and HER2 negative.    PET/CT scan done on 08/08/2022 revealed left anterior chest wall mass with left retropectoral nodes and a left 11th rib lesion.  No other metastatic disease.    She has seen Radiation Oncology for radiation and completed on 09/19/2022.    Patient on anastrozole due to hormone receptor positivity.    We could consider a CDK4/6 inhibitor, but to her advanced age, we will continue with aromatase inhibitor alone and get a PET/CT scan in December.    Continue Anastrozole     DEXA scan done on 10/11/2022 did show osteopenia.  The patient does wear dentures, and these sits directly on her bone.  I worry that if we used a bisphosphonate or denosumab, she would be very high risk for osteonecrosis of the jaw.  She will take 2000 units of vitamin-D daily and continue calcium supplementation.    PET/CT scan prior to follow up    Return to clinic in 6 weeks to review scan results    Labs:CBC, CMP, CEA, CA 15-3    Kendell Bhatt II, MD      I, Thais Ramos LPN, acted solely as a scribe for and in the presence of, Dr. Kendell Bhatt who performed the service.

## 2022-11-16 ENCOUNTER — PATIENT MESSAGE (OUTPATIENT)
Dept: HEMATOLOGY/ONCOLOGY | Facility: CLINIC | Age: 87
End: 2022-11-16
Payer: MEDICARE

## 2022-12-19 ENCOUNTER — HOSPITAL ENCOUNTER (OUTPATIENT)
Dept: RADIOLOGY | Facility: HOSPITAL | Age: 87
Discharge: HOME | End: 2022-12-19
Attending: INTERNAL MEDICINE
Payer: MEDICARE

## 2022-12-19 DIAGNOSIS — Z17.0 MALIGNANT NEOPLASM OF BREAST IN FEMALE, ESTROGEN RECEPTOR POSITIVE, UNSPECIFIED LATERALITY, UNSPECIFIED SITE OF BREAST: ICD-10-CM

## 2022-12-19 DIAGNOSIS — M85.80 OSTEOPENIA, UNSPECIFIED LOCATION: ICD-10-CM

## 2022-12-19 DIAGNOSIS — C79.2 SECONDARY MALIGNANT NEOPLASM OF SKIN: ICD-10-CM

## 2022-12-19 DIAGNOSIS — C50.919 MALIGNANT NEOPLASM OF BREAST IN FEMALE, ESTROGEN RECEPTOR POSITIVE, UNSPECIFIED LATERALITY, UNSPECIFIED SITE OF BREAST: ICD-10-CM

## 2022-12-19 PROCEDURE — A9552 F18 FDG: HCPCS

## 2022-12-19 PROCEDURE — 78815 PET IMAGE W/CT SKULL-THIGH: CPT | Mod: TC

## 2022-12-20 NOTE — PROGRESS NOTES
Subjective:       Patient ID: Darby Chavez is a 87 y.o. female.    Chief Complaint: No chief complaint on file.        Diagnosis:  1.  Right-sided breast cancer in the late 1990s/early 2000s status post mastectomy.  Patient unsure of surgeon's name, unsure of receptor status, did not receive chemotherapy or radiation.  2. Breast cancer of the left breast 2 years after initial right breast cancer, also status post mastectomy with no chemo or radiation, details unknown.    3.  Metastatic breast cancer of the skin at the site of her left mastectomy scar diagnosed 06/01/2022    Current Treatment:   1. Anastrozole     Treatment History:  1. Right mastectomy in the late 1990s/early 2000s, left mastectomy 2 years later.    HPI:  Patient who was diagnosed with right-sided breast cancer in the late 1990s/early 2000s, status post mastectomy of the right breast.  She did not receive any chemo or radiation. Two years later, she was diagnosed with left-sided breast cancer, also underwent left-sided mastectomy without chemotherapy or radiation.  In 2022, she noticed a nodule/rash at the site of her left mastectomy scar.  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  He was referred Oncology.  I saw her on 07/05/2022.  At that visit, she stated that she has oozing at the site of her metastatic cancer.  She stated that it does not hurt, in only causes problems when she hits it.  I requested that receptor studies be done on her biopsy, this returned ER/KY positive and HER2 negative. PET/CT scan done on 8/8/2022 revealed hypermetabolic left anterior chest wall mass with hypermetabolic left retropectoral lymph nodes and metastasis at the left 11th costovertebral junction and posterior rib.  No other evidence of metastatic disease.  Completed radiation on 09/19/2022.  DEXA scan done on 10/11/2022 showed  osteopenia. PET/CT scan done on 12/19/2022 showed resolution of hypermetabolic mass along the left mastectomy bed, resolution of left pectoral adenopathy, decreased FDG activity associated with the left 11th costovertebral junction, no new PET findings to suggest disease progression.    Interval History:   Patient presents to clinic for scheduled follow up appointment to review scan results.  She had her family with her. She is very happy with her scan results. She is tolerating anastrozole very well.  She has not noticed any side effects at all.  She does state that she has dentures, and these dentures sit on her bone.      Past Medical History:   Diagnosis Date    Anxiety     Arthritis     Breast cancer     CAD (coronary artery disease)     Hyperlipidemia     Hypertension     Mild cognitive impairment     Restless legs syndrome       Past Surgical History:   Procedure Laterality Date    BREAST SURGERY  2000    CATARACT EXTRACTION      COLONOSCOPY      CORONARY STENT PLACEMENT      EYE SURGERY  2018    HYSTERECTOMY      MASTECTOMY Bilateral     TONSILLECTOMY      UTERINE SUSPENSION      WRIST SURGERY Right      Social History     Socioeconomic History    Marital status:    Occupational History    Occupation: Retired   Tobacco Use    Smoking status: Former     Packs/day: 1.00     Years: 10.00     Pack years: 10.00     Types: Cigarettes    Smokeless tobacco: Never   Substance and Sexual Activity    Alcohol use: Not Currently    Drug use: Never      Family History   Problem Relation Age of Onset    Breast cancer Maternal Aunt     Breast cancer Maternal Aunt     Heart disease Father       Review of patient's allergies indicates:   Allergen Reactions    Sulfa (sulfonamide antibiotics)      Other reaction(s): itching      Review of Systems   Constitutional:  Negative for appetite change and unexpected weight change.   HENT:  Negative for mouth sores.    Eyes:  Negative for visual disturbance.   Respiratory:   Negative for cough and shortness of breath.    Cardiovascular:  Negative for chest pain.   Gastrointestinal:  Negative for abdominal pain and diarrhea.   Genitourinary:  Negative for frequency.   Musculoskeletal:  Negative for back pain.   Integumentary:  Negative for rash.   Neurological:  Negative for headaches.   Hematological:  Negative for adenopathy.   Psychiatric/Behavioral:  The patient is not nervous/anxious.        Objective:      Physical Exam  Vitals reviewed.   Constitutional:       General: She is awake.      Appearance: Normal appearance. She is well-developed.   HENT:      Head: Normocephalic and atraumatic.   Eyes:      General: Lids are normal. Vision grossly intact.      Extraocular Movements: Extraocular movements intact.      Conjunctiva/sclera: Conjunctivae normal.   Cardiovascular:      Rate and Rhythm: Normal rate and regular rhythm.      Pulses: Normal pulses.      Heart sounds: Normal heart sounds.   Pulmonary:      Effort: Pulmonary effort is normal.      Breath sounds: Normal breath sounds.   Musculoskeletal:      Cervical back: Full passive range of motion without pain.      Right lower leg: No edema.      Left lower leg: No edema.   Lymphadenopathy:      Cervical: No cervical adenopathy.   Skin:     General: Skin is warm and dry.   Neurological:      General: No focal deficit present.      Mental Status: She is alert and oriented to person, place, and time.   Psychiatric:         Attention and Perception: Attention and perception normal.         Behavior: Behavior is cooperative.       LABS AND IMAGES REVIEWED IN [facility]          Assessment:     1. Right-sided breast cancer in the late 1990s/early 2000s status post mastectomy.  Patient unsure of surgeon's name, unsure of receptor status, did not receive chemotherapy or radiation.  2. Breast cancer of the left breast 2 years after initial right breast cancer, also status post mastectomy with no chemo or radiation, details unknown.    3.  Metastatic breast cancer of the skin at the site of her left mastectomy scar diagnosed 06/01/2022, ER/MD positive, HER2 negative  4. Osteopenia        Plan:       Patient with history of right-sided breast cancer in the late 1990s/early 2000s with left-sided breast cancer that occurred 2 years later.  She now has recurrence at the site of her left mastectomy scar.    This returned ER/MD positive and HER2 negative.    PET/CT scan done on 08/08/2022 revealed left anterior chest wall mass with left retropectoral nodes and a left 11th rib lesion.  No other metastatic disease.    She was seen by Radiation Oncology for radiation and completed radiation therapy on 09/19/2022.    Patient on anastrozole due to hormone receptor positivity.    We could consider a CDK4/6 inhibitor, but to her advanced age, we will continue with aromatase inhibitor alone.  PET scan on 12/19/2022 showed no evidence of new or worsening disease.  Plan to repeat in 6 months.    Continue Anastrozole     DEXA scan done on 10/11/2022 did show osteopenia.  The patient does wear dentures, and these sits directly on her bone.  I worry that if we used a bisphosphonate or denosumab, she would be very high risk for osteonecrosis of the jaw.  She will take 2000 units of vitamin-D daily and continue calcium supplementation.    Plan to repeat in 6 months    Return to clinic in 3 months with labs prior    Labs:CBC, CMP, CEA, CA 15-3    Kendell Bhatt II, MD      I, Thais Ramos LPN, acted solely as a scribe for and in the presence of, Dr. Kendell Bhatt who performed the service.

## 2022-12-21 ENCOUNTER — OFFICE VISIT (OUTPATIENT)
Dept: HEMATOLOGY/ONCOLOGY | Facility: CLINIC | Age: 87
End: 2022-12-21
Payer: MEDICARE

## 2022-12-21 VITALS
TEMPERATURE: 98 F | HEIGHT: 63 IN | WEIGHT: 155.19 LBS | BODY MASS INDEX: 27.5 KG/M2 | DIASTOLIC BLOOD PRESSURE: 83 MMHG | SYSTOLIC BLOOD PRESSURE: 143 MMHG | HEART RATE: 86 BPM | OXYGEN SATURATION: 97 % | RESPIRATION RATE: 18 BRPM

## 2022-12-21 DIAGNOSIS — C79.2 SECONDARY MALIGNANT NEOPLASM OF SKIN: ICD-10-CM

## 2022-12-21 DIAGNOSIS — C50.919 CARCINOMA OF BREAST METASTATIC TO BONE, UNSPECIFIED LATERALITY: Primary | ICD-10-CM

## 2022-12-21 DIAGNOSIS — M85.80 OSTEOPENIA, UNSPECIFIED LOCATION: ICD-10-CM

## 2022-12-21 DIAGNOSIS — C79.51 CARCINOMA OF BREAST METASTATIC TO BONE, UNSPECIFIED LATERALITY: Primary | ICD-10-CM

## 2022-12-21 PROCEDURE — 1126F PR PAIN SEVERITY QUANTIFIED, NO PAIN PRESENT: ICD-10-PCS | Mod: CPTII,S$GLB,, | Performed by: INTERNAL MEDICINE

## 2022-12-21 PROCEDURE — 1159F PR MEDICATION LIST DOCUMENTED IN MEDICAL RECORD: ICD-10-PCS | Mod: CPTII,S$GLB,, | Performed by: INTERNAL MEDICINE

## 2022-12-21 PROCEDURE — 1160F PR REVIEW ALL MEDS BY PRESCRIBER/CLIN PHARMACIST DOCUMENTED: ICD-10-PCS | Mod: CPTII,S$GLB,, | Performed by: INTERNAL MEDICINE

## 2022-12-21 PROCEDURE — 1101F PR PT FALLS ASSESS DOC 0-1 FALLS W/OUT INJ PAST YR: ICD-10-PCS | Mod: CPTII,S$GLB,, | Performed by: INTERNAL MEDICINE

## 2022-12-21 PROCEDURE — 3288F PR FALLS RISK ASSESSMENT DOCUMENTED: ICD-10-PCS | Mod: CPTII,S$GLB,, | Performed by: INTERNAL MEDICINE

## 2022-12-21 PROCEDURE — 1101F PT FALLS ASSESS-DOCD LE1/YR: CPT | Mod: CPTII,S$GLB,, | Performed by: INTERNAL MEDICINE

## 2022-12-21 PROCEDURE — 99999 PR PBB SHADOW E&M-EST. PATIENT-LVL V: CPT | Mod: PBBFAC,,, | Performed by: INTERNAL MEDICINE

## 2022-12-21 PROCEDURE — 99214 PR OFFICE/OUTPT VISIT, EST, LEVL IV, 30-39 MIN: ICD-10-PCS | Mod: S$GLB,,, | Performed by: INTERNAL MEDICINE

## 2022-12-21 PROCEDURE — 99999 PR PBB SHADOW E&M-EST. PATIENT-LVL V: ICD-10-PCS | Mod: PBBFAC,,, | Performed by: INTERNAL MEDICINE

## 2022-12-21 PROCEDURE — 99214 OFFICE O/P EST MOD 30 MIN: CPT | Mod: S$GLB,,, | Performed by: INTERNAL MEDICINE

## 2022-12-21 PROCEDURE — 3288F FALL RISK ASSESSMENT DOCD: CPT | Mod: CPTII,S$GLB,, | Performed by: INTERNAL MEDICINE

## 2022-12-21 PROCEDURE — 1160F RVW MEDS BY RX/DR IN RCRD: CPT | Mod: CPTII,S$GLB,, | Performed by: INTERNAL MEDICINE

## 2022-12-21 PROCEDURE — 1126F AMNT PAIN NOTED NONE PRSNT: CPT | Mod: CPTII,S$GLB,, | Performed by: INTERNAL MEDICINE

## 2022-12-21 PROCEDURE — 1159F MED LIST DOCD IN RCRD: CPT | Mod: CPTII,S$GLB,, | Performed by: INTERNAL MEDICINE

## 2023-01-02 PROBLEM — I21.9 MYOCARDIAL INFARCTION: Status: RESOLVED | Noted: 2022-09-29 | Resolved: 2023-01-02

## 2023-03-13 ENCOUNTER — TELEPHONE (OUTPATIENT)
Dept: HEMATOLOGY/ONCOLOGY | Facility: CLINIC | Age: 88
End: 2023-03-13
Payer: MEDICARE

## 2023-03-21 ENCOUNTER — OFFICE VISIT (OUTPATIENT)
Dept: HEMATOLOGY/ONCOLOGY | Facility: CLINIC | Age: 88
End: 2023-03-21
Payer: MEDICARE

## 2023-03-21 VITALS
HEIGHT: 63 IN | HEART RATE: 86 BPM | BODY MASS INDEX: 27.29 KG/M2 | DIASTOLIC BLOOD PRESSURE: 82 MMHG | TEMPERATURE: 98 F | OXYGEN SATURATION: 98 % | SYSTOLIC BLOOD PRESSURE: 145 MMHG | WEIGHT: 154 LBS

## 2023-03-21 DIAGNOSIS — C79.2 SECONDARY MALIGNANT NEOPLASM OF SKIN: ICD-10-CM

## 2023-03-21 DIAGNOSIS — Z17.0 MALIGNANT NEOPLASM OF BREAST IN FEMALE, ESTROGEN RECEPTOR POSITIVE, UNSPECIFIED LATERALITY, UNSPECIFIED SITE OF BREAST: Primary | ICD-10-CM

## 2023-03-21 DIAGNOSIS — C50.919 CARCINOMA OF BREAST METASTATIC TO BONE, UNSPECIFIED LATERALITY: ICD-10-CM

## 2023-03-21 DIAGNOSIS — C79.51 CARCINOMA OF BREAST METASTATIC TO BONE, UNSPECIFIED LATERALITY: ICD-10-CM

## 2023-03-21 DIAGNOSIS — C50.919 MALIGNANT NEOPLASM OF BREAST IN FEMALE, ESTROGEN RECEPTOR POSITIVE, UNSPECIFIED LATERALITY, UNSPECIFIED SITE OF BREAST: Primary | ICD-10-CM

## 2023-03-21 PROCEDURE — 99999 PR PBB SHADOW E&M-EST. PATIENT-LVL IV: CPT | Mod: PBBFAC,,, | Performed by: INTERNAL MEDICINE

## 2023-03-21 PROCEDURE — 1101F PT FALLS ASSESS-DOCD LE1/YR: CPT | Mod: CPTII,S$GLB,, | Performed by: INTERNAL MEDICINE

## 2023-03-21 PROCEDURE — 3288F PR FALLS RISK ASSESSMENT DOCUMENTED: ICD-10-PCS | Mod: CPTII,S$GLB,, | Performed by: INTERNAL MEDICINE

## 2023-03-21 PROCEDURE — 99214 OFFICE O/P EST MOD 30 MIN: CPT | Mod: S$GLB,,, | Performed by: INTERNAL MEDICINE

## 2023-03-21 PROCEDURE — 1159F PR MEDICATION LIST DOCUMENTED IN MEDICAL RECORD: ICD-10-PCS | Mod: CPTII,S$GLB,, | Performed by: INTERNAL MEDICINE

## 2023-03-21 PROCEDURE — 99999 PR PBB SHADOW E&M-EST. PATIENT-LVL IV: ICD-10-PCS | Mod: PBBFAC,,, | Performed by: INTERNAL MEDICINE

## 2023-03-21 PROCEDURE — 1159F MED LIST DOCD IN RCRD: CPT | Mod: CPTII,S$GLB,, | Performed by: INTERNAL MEDICINE

## 2023-03-21 PROCEDURE — 99214 PR OFFICE/OUTPT VISIT, EST, LEVL IV, 30-39 MIN: ICD-10-PCS | Mod: S$GLB,,, | Performed by: INTERNAL MEDICINE

## 2023-03-21 PROCEDURE — 1126F PR PAIN SEVERITY QUANTIFIED, NO PAIN PRESENT: ICD-10-PCS | Mod: CPTII,S$GLB,, | Performed by: INTERNAL MEDICINE

## 2023-03-21 PROCEDURE — 1160F PR REVIEW ALL MEDS BY PRESCRIBER/CLIN PHARMACIST DOCUMENTED: ICD-10-PCS | Mod: CPTII,S$GLB,, | Performed by: INTERNAL MEDICINE

## 2023-03-21 PROCEDURE — 1126F AMNT PAIN NOTED NONE PRSNT: CPT | Mod: CPTII,S$GLB,, | Performed by: INTERNAL MEDICINE

## 2023-03-21 PROCEDURE — 1101F PR PT FALLS ASSESS DOC 0-1 FALLS W/OUT INJ PAST YR: ICD-10-PCS | Mod: CPTII,S$GLB,, | Performed by: INTERNAL MEDICINE

## 2023-03-21 PROCEDURE — 1160F RVW MEDS BY RX/DR IN RCRD: CPT | Mod: CPTII,S$GLB,, | Performed by: INTERNAL MEDICINE

## 2023-03-21 PROCEDURE — 3288F FALL RISK ASSESSMENT DOCD: CPT | Mod: CPTII,S$GLB,, | Performed by: INTERNAL MEDICINE

## 2023-03-21 NOTE — PROGRESS NOTES
Subjective:       Patient ID: Darby Chavez is a 87 y.o. female.    Chief Complaint: No chief complaint on file.        Diagnosis:  1.  Right-sided breast cancer in the late 1990s/early 2000s status post mastectomy.  Patient unsure of surgeon's name, unsure of receptor status, did not receive chemotherapy or radiation.  2. Breast cancer of the left breast 2 years after initial right breast cancer, also status post mastectomy with no chemo or radiation, details unknown.    3.  Metastatic breast cancer of the skin at the site of her left mastectomy scar diagnosed 06/01/2022    Current Treatment:   1. Anastrozole     Treatment History:  1. Right mastectomy in the late 1990s/early 2000s, left mastectomy 2 years later.    HPI:  Patient who was diagnosed with right-sided breast cancer in the late 1990s/early 2000s, status post mastectomy of the right breast.  She did not receive any chemo or radiation. Two years later, she was diagnosed with left-sided breast cancer, also underwent left-sided mastectomy without chemotherapy or radiation.  In 2022, she noticed a nodule/rash at the site of her left mastectomy scar.  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  He was referred Oncology.  I saw her on 07/05/2022.  At that visit, she stated that she has oozing at the site of her metastatic cancer.  She stated that it does not hurt, in only causes problems when she hits it.  I requested that receptor studies be done on her biopsy, this returned ER/IN positive and HER2 negative. PET/CT scan done on 8/8/2022 revealed hypermetabolic left anterior chest wall mass with hypermetabolic left retropectoral lymph nodes and metastasis at the left 11th costovertebral junction and posterior rib.  No other evidence of metastatic disease.  Completed radiation on 09/19/2022.  DEXA scan done on 10/11/2022 showed  osteopenia. PET/CT scan done on 12/19/2022 showed resolution of hypermetabolic mass along the left mastectomy bed, resolution of left pectoral adenopathy, decreased FDG activity associated with the left 11th costovertebral junction, no new PET findings to suggest disease progression.    Interval History:   Patient presents to clinic for scheduled follow up appointment.  Overall, she is doing well.  She tolerates anastrozole well.  She does not have any limitations from the medication.    Past Medical History:   Diagnosis Date    Anxiety     Arthritis     Breast cancer     CAD (coronary artery disease)     Hyperlipidemia     Hypertension     Mild cognitive impairment     Restless legs syndrome       Past Surgical History:   Procedure Laterality Date    BREAST SURGERY  2000    CATARACT EXTRACTION      COLONOSCOPY      CORONARY STENT PLACEMENT      EYE SURGERY  2018    HYSTERECTOMY      MASTECTOMY Bilateral     TONSILLECTOMY      UTERINE SUSPENSION      WRIST SURGERY Right      Social History     Socioeconomic History    Marital status:    Occupational History    Occupation: Retired   Tobacco Use    Smoking status: Former     Packs/day: 1.00     Years: 10.00     Pack years: 10.00     Types: Cigarettes    Smokeless tobacco: Never   Substance and Sexual Activity    Alcohol use: Not Currently    Drug use: Never      Family History   Problem Relation Age of Onset    Breast cancer Maternal Aunt     Breast cancer Maternal Aunt     Heart disease Father       Review of patient's allergies indicates:   Allergen Reactions    Sulfa (sulfonamide antibiotics)      Other reaction(s): itching      Review of Systems   Constitutional:  Negative for appetite change and unexpected weight change.   HENT:  Negative for mouth sores.    Eyes:  Negative for visual disturbance.   Respiratory:  Negative for cough and shortness of breath.    Cardiovascular:  Negative for chest pain.   Gastrointestinal:  Negative for abdominal pain and  diarrhea.   Genitourinary:  Negative for frequency.   Musculoskeletal:  Negative for back pain.   Integumentary:  Negative for rash.   Neurological:  Negative for headaches.   Hematological:  Negative for adenopathy.   Psychiatric/Behavioral:  The patient is not nervous/anxious.        Objective:      Physical Exam  Vitals reviewed.   Constitutional:       General: She is awake.      Appearance: Normal appearance. She is well-developed.   HENT:      Head: Normocephalic and atraumatic.   Eyes:      General: Lids are normal. Vision grossly intact.      Extraocular Movements: Extraocular movements intact.      Conjunctiva/sclera: Conjunctivae normal.   Cardiovascular:      Rate and Rhythm: Normal rate and regular rhythm.      Pulses: Normal pulses.      Heart sounds: Normal heart sounds.   Pulmonary:      Effort: Pulmonary effort is normal.      Breath sounds: Normal breath sounds.   Musculoskeletal:      Cervical back: Full passive range of motion without pain.      Right lower leg: No edema.      Left lower leg: No edema.   Lymphadenopathy:      Cervical: No cervical adenopathy.   Skin:     General: Skin is warm and dry.   Neurological:      General: No focal deficit present.      Mental Status: She is alert and oriented to person, place, and time.   Psychiatric:         Attention and Perception: Attention and perception normal.         Behavior: Behavior is cooperative.       LABS AND IMAGES REVIEWED IN [facility]          Assessment:     1. Right-sided breast cancer in the late 1990s/early 2000s status post mastectomy.  Patient unsure of surgeon's name, unsure of receptor status, did not receive chemotherapy or radiation.  2. Breast cancer of the left breast 2 years after initial right breast cancer, also status post mastectomy with no chemo or radiation, details unknown.    3. Metastatic breast cancer of the skin at the site of her left mastectomy scar diagnosed 06/01/2022, ER/VA positive, HER2 negative  4.  Osteopenia        Plan:       Patient with history of right-sided breast cancer in the late 1990s/early 2000s with left-sided breast cancer that occurred 2 years later.  She now has recurrence at the site of her left mastectomy scar.    This returned ER/[place] positive and HER2 negative.    PET/CT scan done on 08/08/2022 revealed left anterior chest wall mass with left retropectoral nodes and a left 11th rib lesion.  No other metastatic disease.    She was seen by Radiation Oncology for radiation and completed radiation therapy on 09/19/2022.    Patient on anastrozole due to hormone receptor positivity.    We could consider a CDK4/6 inhibitor, but to her advanced age, we will continue with aromatase inhibitor alone.  PET scan on 12/19/2022 showed no evidence of new or worsening disease.  Plan to repeat in 6 months.    Continue Anastrozole     DEXA scan done on 10/11/2022 did show osteopenia.  The patient does wear dentures, and these sits directly on her bone.  I worry that if we used a bisphosphonate or denosumab, she would be very high risk for osteonecrosis of the jaw.  She will continue 2000 units of vitamin-D daily and continue calcium supplementation.    Return to clinic in 3 months with labs and PET/CT scan prior.  Of note, her tumor markers done on 03/16/2023 are very stable, CEA improved from prior, CA 15 3 within normal limits.    Labs:CBC, CMP, CEA, CA 15-3    Kendell Bhatt II, MD I, Thais Ramos LPN, acted solely as a scribe for and in the presence of, Dr. Kendell Bhatt who performed the service.

## 2023-04-17 ENCOUNTER — OFFICE VISIT (OUTPATIENT)
Dept: NEUROLOGY | Facility: CLINIC | Age: 88
End: 2023-04-17
Payer: MEDICARE

## 2023-04-17 VITALS
WEIGHT: 154 LBS | SYSTOLIC BLOOD PRESSURE: 124 MMHG | BODY MASS INDEX: 26.29 KG/M2 | HEART RATE: 76 BPM | DIASTOLIC BLOOD PRESSURE: 86 MMHG | HEIGHT: 64 IN

## 2023-04-17 DIAGNOSIS — R41.3 MEMORY LOSS: ICD-10-CM

## 2023-04-17 DIAGNOSIS — G31.84 MILD COGNITIVE IMPAIRMENT: ICD-10-CM

## 2023-04-17 PROCEDURE — 1159F PR MEDICATION LIST DOCUMENTED IN MEDICAL RECORD: ICD-10-PCS | Mod: CPTII,S$GLB,, | Performed by: NURSE PRACTITIONER

## 2023-04-17 PROCEDURE — 1126F PR PAIN SEVERITY QUANTIFIED, NO PAIN PRESENT: ICD-10-PCS | Mod: CPTII,S$GLB,, | Performed by: NURSE PRACTITIONER

## 2023-04-17 PROCEDURE — 3288F FALL RISK ASSESSMENT DOCD: CPT | Mod: CPTII,S$GLB,, | Performed by: NURSE PRACTITIONER

## 2023-04-17 PROCEDURE — 1126F AMNT PAIN NOTED NONE PRSNT: CPT | Mod: CPTII,S$GLB,, | Performed by: NURSE PRACTITIONER

## 2023-04-17 PROCEDURE — 1101F PT FALLS ASSESS-DOCD LE1/YR: CPT | Mod: CPTII,S$GLB,, | Performed by: NURSE PRACTITIONER

## 2023-04-17 PROCEDURE — 3288F PR FALLS RISK ASSESSMENT DOCUMENTED: ICD-10-PCS | Mod: CPTII,S$GLB,, | Performed by: NURSE PRACTITIONER

## 2023-04-17 PROCEDURE — 99999 PR PBB SHADOW E&M-EST. PATIENT-LVL IV: CPT | Mod: PBBFAC,,, | Performed by: NURSE PRACTITIONER

## 2023-04-17 PROCEDURE — 1160F PR REVIEW ALL MEDS BY PRESCRIBER/CLIN PHARMACIST DOCUMENTED: ICD-10-PCS | Mod: CPTII,S$GLB,, | Performed by: NURSE PRACTITIONER

## 2023-04-17 PROCEDURE — 99214 OFFICE O/P EST MOD 30 MIN: CPT | Mod: S$GLB,,, | Performed by: NURSE PRACTITIONER

## 2023-04-17 PROCEDURE — 1101F PR PT FALLS ASSESS DOC 0-1 FALLS W/OUT INJ PAST YR: ICD-10-PCS | Mod: CPTII,S$GLB,, | Performed by: NURSE PRACTITIONER

## 2023-04-17 PROCEDURE — 99214 PR OFFICE/OUTPT VISIT, EST, LEVL IV, 30-39 MIN: ICD-10-PCS | Mod: S$GLB,,, | Performed by: NURSE PRACTITIONER

## 2023-04-17 PROCEDURE — 1159F MED LIST DOCD IN RCRD: CPT | Mod: CPTII,S$GLB,, | Performed by: NURSE PRACTITIONER

## 2023-04-17 PROCEDURE — 99999 PR PBB SHADOW E&M-EST. PATIENT-LVL IV: ICD-10-PCS | Mod: PBBFAC,,, | Performed by: NURSE PRACTITIONER

## 2023-04-17 PROCEDURE — 1160F RVW MEDS BY RX/DR IN RCRD: CPT | Mod: CPTII,S$GLB,, | Performed by: NURSE PRACTITIONER

## 2023-04-17 RX ORDER — DONEPEZIL HYDROCHLORIDE 5 MG/1
5 TABLET, FILM COATED ORAL 2 TIMES DAILY
Qty: 60 TABLET | Refills: 11 | Status: SHIPPED | OUTPATIENT
Start: 2023-04-17

## 2023-04-17 NOTE — PROGRESS NOTES
Subjective:       Patient ID: Darby Chavez is a 88 y.o. female.    Chief Complaint: Mild cognitive impairment (No changes noted in Memory. States is doing okay. )      History of Present Illness:  Follow-up visit for [facility].  She is here with her daughter-in-law.  Doing well.  Both patient and daughter-in-law feel the patient is stable from a memory perspective.  Sleep is good, mood is stable.  Still independent of all ADLs.  Lives alone in a mother-in-law suite behind her son and daughter-in-law.  She is still on Aricept 5 mg b.i.d. and tolerating it well.  Specifically denies any diarrhea.  Denies any recent hospitalizations or illnesses.  No issues with balance or tremor.  No falls      Review of Systems  I have reviewed a 12 point review of systems which is negative unless otherwise stated in HPI        Past Medical History:   Diagnosis Date    Anxiety     Arthritis     Breast cancer     CAD (coronary artery disease)     Hyperlipidemia     Hypertension     Mild cognitive impairment     Restless legs syndrome        Past Surgical History:   Procedure Laterality Date    BREAST SURGERY  2000    CATARACT EXTRACTION      COLONOSCOPY      CORONARY STENT PLACEMENT      EYE SURGERY  2018    HYSTERECTOMY      MASTECTOMY Bilateral     TONSILLECTOMY      UTERINE SUSPENSION      WRIST SURGERY Right         Family History   Problem Relation Age of Onset    Breast cancer Maternal Aunt     Breast cancer Maternal Aunt     Heart disease Father         Social History     Socioeconomic History    Marital status:    Occupational History    Occupation: Retired   Tobacco Use    Smoking status: Former     Packs/day: 1.00     Years: 10.00     Pack years: 10.00     Types: Cigarettes    Smokeless tobacco: Never   Substance and Sexual Activity    Alcohol use: Not Currently    Drug use: Never        Outpatient Encounter Medications as of 4/17/2023   Medication Sig Dispense Refill    anastrozole (ARIMIDEX) 1 mg Tab Take 1 tablet (1  "mg total) by mouth once daily. 30 tablet 2    ascorbic acid, vitamin C, (VITAMIN C) 250 MG tablet Take 250 mg by mouth once daily.      aspirin (ECOTRIN) 81 MG EC tablet Take 81 mg by mouth once daily.      calcium carbonate (OS-KAREN) 500 mg calcium (1,250 mg) tablet Take 1 tablet by mouth once daily.      cyanocobalamin (VITAMIN B-12) 1000 MCG tablet Take 100 mcg by mouth every other day.      esomeprazole (NEXIUM) 40 MG capsule Take 40 mg by mouth once daily.      levocetirizine (XYZAL) 5 MG tablet Take 5 mg by mouth daily as needed.      losartan (COZAAR) 25 MG tablet Take 25 mg by mouth once daily.      montelukast (SINGULAIR) 10 mg tablet Take 10 mg by mouth once daily.      multivitamin/iron/folic acid (CENTRUM COMPLETE ORAL) Take 1 tablet by mouth Daily.      nitroGLYCERIN (NITROSTAT) 0.4 MG SL tablet Place under the tongue.      pravastatin (PRAVACHOL) 20 MG tablet Take 20 mg by mouth once daily.      venlafaxine (EFFEXOR-XR) 37.5 MG 24 hr capsule Take 37.5 mg by mouth once daily.      vit C,X-Pg-wwxgr-lutein-zeaxan (PRESERVISION AREDS-2) 250-90-40-1 mg Cap Take 1 capsule by mouth once daily at 6am.      vitamin D (VITAMIN D3) 1000 units Tab Take 1,000 Units by mouth once daily.      [DISCONTINUED] donepeziL (ARICEPT) 5 MG tablet Take 1 tablet (5 mg total) by mouth 2 (two) times a day. 60 tablet 5    donepeziL (ARICEPT) 5 MG tablet Take 1 tablet (5 mg total) by mouth 2 (two) times a day. 60 tablet 11    tolterodine (DETROL) 1 MG Tab Take 1 mg by mouth 2 (two) times daily.       No facility-administered encounter medications on file as of 4/17/2023.      Objective:   /86 (BP Location: Left arm)   Pulse 76   Ht 5' 4" (1.626 m)   Wt 69.9 kg (154 lb)   BMI 26.43 kg/m²        Physical Exam  General:  Alert and oriented  NAD  No overt edema    Cognition and Comprehension:  Speech and language intact  Follows commands    Cranial nerves:   CN 2_ Visual fields (full to confrontation both eyes)  CN 3, 4, 6_ " Intact,, no nystagmus  CN 5_facial tactile sensation intact  CN 7_no face asymmetry; normal eye closure and smile  CN 8_hearing intact to spoken voice  CN 9, 10, 11_voice normal, shoulder shrug ok; deltoids not fatigable   CN 12 tongue_protrudes mid line    Muscle Strength and Tone:  Normal upper extremity tone  Normal lower extremity tone  Normal upper extremity strength  Normal lower extremity strength    Coordination and Gait:  Coordination and gait are normal   No ataxia with FTN      Assessment & Plan:      1. Mild cognitive impairment  Overview:  Established in May 2018 for STM problems that started about a year prior following hospital stay for ischemic colitis. CT head 2021 chronic ischemia.  She is on aricept 5 mg BID    MOCA scores:  05/2018: 24/30 11/2018: 25/30 05/2019: 25/30 06/2021: 21/30 08/2022: 22/30    Assessment & Plan:  -Continue aricept 5 mg BID  -Patient would like yearly follow ups      2. Memory loss  -     donepeziL (ARICEPT) 5 MG tablet; Take 1 tablet (5 mg total) by mouth 2 (two) times a day.  Dispense: 60 tablet; Refill: 11          This note was created with the assistance of voice recognition software. There may be transcription errors as a result of using this technology however minimal. Effort has been made to assure accuracy of transcription but any obvious errors or omissions should be clarified with the author of the document.

## 2023-04-18 DIAGNOSIS — C79.51 CARCINOMA OF LEFT BREAST METASTATIC TO BONE: ICD-10-CM

## 2023-04-18 DIAGNOSIS — C50.912 CARCINOMA OF LEFT BREAST METASTATIC TO BONE: ICD-10-CM

## 2023-04-18 RX ORDER — ANASTROZOLE 1 MG/1
1 TABLET ORAL DAILY
Qty: 30 TABLET | Refills: 2 | Status: SHIPPED | OUTPATIENT
Start: 2023-04-18 | End: 2023-06-29 | Stop reason: SDUPTHER

## 2023-06-07 ENCOUNTER — PATIENT MESSAGE (OUTPATIENT)
Dept: HEMATOLOGY/ONCOLOGY | Facility: CLINIC | Age: 88
End: 2023-06-07
Payer: MEDICARE

## 2023-06-12 ENCOUNTER — PATIENT MESSAGE (OUTPATIENT)
Dept: HEMATOLOGY/ONCOLOGY | Facility: CLINIC | Age: 88
End: 2023-06-12
Payer: MEDICARE

## 2023-06-13 NOTE — PROGRESS NOTES
Subjective:       Patient ID: Darby Chavez is a 88 y.o. female.    Chief Complaint: Other Misc (Pt reports that she fractured her ankle 06/07/2023), Pain, and Back Pain        Diagnosis:  1.  Right-sided breast cancer in the late 1990s/early 2000s status post mastectomy.  Patient unsure of surgeon's name, unsure of receptor status, did not receive chemotherapy or radiation.  2. Breast cancer of the left breast 2 years after initial right breast cancer, also status post mastectomy with no chemo or radiation, details unknown.    3.  Metastatic breast cancer of the skin at the site of her left mastectomy scar diagnosed 06/01/2022    Current Treatment:   1. Anastrozole     Treatment History:  1. Right mastectomy in the late 1990s/early 2000s, left mastectomy 2 years later.    HPI:  Patient who was diagnosed with right-sided breast cancer in the late 1990s/early 2000s, status post mastectomy of the right breast.  She did not receive any chemo or radiation. Two years later, she was diagnosed with left-sided breast cancer, also underwent left-sided mastectomy without chemotherapy or radiation.  In 2022, she noticed a nodule/rash at the site of her left mastectomy scar.  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  He was referred Oncology.  I saw her on 07/05/2022.  At that visit, she stated that she has oozing at the site of her metastatic cancer.  She stated that it does not hurt, in only causes problems when she hits it.  I requested that receptor studies be done on her biopsy, this returned ER/AK positive and HER2 negative. PET/CT scan done on 8/8/2022 revealed hypermetabolic left anterior chest wall mass with hypermetabolic left retropectoral lymph nodes and metastasis at the left 11th costovertebral junction and posterior rib.  No other evidence of metastatic disease.  Completed  radiation on 09/19/2022.  DEXA scan done on 10/11/2022 showed osteopenia.  Most recent PET/CT scan on 06/15/2023 did show continued decrease FDG avidity at the 11th rib costovertebral junction and a new deformity at T12 with mild avidity, likely traumatic with no mass noted.    Interval History:   Patient presents to clinic for scheduled follow up appointment.  Since I last saw her, she did have a fall.  She hurt her right leg.  She also was diagnosed with a T12 fracture.  This was noted on her PET, no mass, this is not likely pathologic.    Past Medical History:   Diagnosis Date    Anxiety     Arthritis     Breast cancer     CAD (coronary artery disease)     Hyperlipidemia     Hypertension     Mild cognitive impairment     Restless legs syndrome       Past Surgical History:   Procedure Laterality Date    BREAST SURGERY  2000    CATARACT EXTRACTION      COLONOSCOPY      CORONARY STENT PLACEMENT      EYE SURGERY  2018    HYSTERECTOMY      MASTECTOMY Bilateral     TONSILLECTOMY      UTERINE SUSPENSION      WRIST SURGERY Right      Social History     Socioeconomic History    Marital status:    Occupational History    Occupation: Retired   Tobacco Use    Smoking status: Former     Packs/day: 1.00     Years: 10.00     Pack years: 10.00     Types: Cigarettes    Smokeless tobacco: Never   Substance and Sexual Activity    Alcohol use: Not Currently    Drug use: Never      Family History   Problem Relation Age of Onset    Breast cancer Maternal Aunt     Breast cancer Maternal Aunt     Heart disease Father       Review of patient's allergies indicates:   Allergen Reactions    Sulfa (sulfonamide antibiotics)      Other reaction(s): itching      Review of Systems   Constitutional:  Negative for appetite change and unexpected weight change.   HENT:  Negative for mouth sores.    Eyes:  Negative for visual disturbance.   Respiratory:  Positive for cough. Negative for shortness of breath.    Cardiovascular:  Negative for  chest pain.   Gastrointestinal:  Negative for abdominal pain and diarrhea.   Genitourinary:  Negative for frequency.   Musculoskeletal:  Positive for back pain.   Integumentary:  Negative for rash.   Neurological:  Negative for headaches.   Hematological:  Negative for adenopathy.   Psychiatric/Behavioral:  The patient is not nervous/anxious.        Objective:      Physical Exam  Vitals reviewed.   Constitutional:       General: She is awake.      Appearance: Normal appearance. She is well-developed.   HENT:      Head: Normocephalic and atraumatic.   Eyes:      General: Lids are normal. Vision grossly intact.      Extraocular Movements: Extraocular movements intact.      Conjunctiva/sclera: Conjunctivae normal.   Cardiovascular:      Rate and Rhythm: Normal rate and regular rhythm.      Pulses: Normal pulses.      Heart sounds: Normal heart sounds.   Pulmonary:      Effort: Pulmonary effort is normal.      Breath sounds: Normal breath sounds.   Musculoskeletal:      Cervical back: Full passive range of motion without pain.      Right lower leg: No edema.      Left lower leg: No edema.   Lymphadenopathy:      Cervical: No cervical adenopathy.   Skin:     General: Skin is warm and dry.   Neurological:      General: No focal deficit present.      Mental Status: She is alert and oriented to person, place, and time.   Psychiatric:         Attention and Perception: Attention and perception normal.         Behavior: Behavior is cooperative.       LABS AND IMAGES REVIEWED IN EPIC          Assessment:     1. Right-sided breast cancer in the late 1990s/early 2000s status post mastectomy.  Patient unsure of surgeon's name, unsure of receptor status, did not receive chemotherapy or radiation.  2. Breast cancer of the left breast 2 years after initial right breast cancer, also status post mastectomy with no chemo or radiation, details unknown.    3. Metastatic breast cancer of the skin at the site of her left mastectomy scar  diagnosed 06/01/2022, ER/CT positive, HER2 negative  4. Osteopenia        Plan:       Patient with history of right-sided breast cancer in the late 1990s/early 2000s with left-sided breast cancer that occurred 2 years later.  She now has recurrence at the site of her left mastectomy scar.    This returned ER/CT positive and HER2 negative.    PET/CT scan done on 08/08/2022 revealed left anterior chest wall mass with left retropectoral nodes and a left 11th rib lesion.  No other metastatic disease.    She was seen by Radiation Oncology for radiation and completed radiation therapy on 09/19/2022.    Patient on anastrozole due to hormone receptor positivity.    We could consider a CDK4/6 inhibitor, but to her advanced age, we will continue with aromatase inhibitor alone.  PET scan on 12/19/2022 showed no evidence of new or worsening disease.  Plan to repeat in 6 months.    DEXA scan done on 10/11/2022 did show osteopenia.  The patient does wear dentures, and these sits directly on her bone.  I worry that if we used a bisphosphonate or denosumab, she would be very high risk for osteonecrosis of the jaw.  She will continue 2000 units of vitamin-D daily and continue calcium supplementation.    Most recent PET/CT scan done on 6/15/2023 showed continued improving disease.    Return to clinic in 6 months with labs and PET/CT scan prior.      Labs:CBC, CMP, CEA, CA 15-3    Kendell Bhatt II, MD

## 2023-06-15 ENCOUNTER — HOSPITAL ENCOUNTER (OUTPATIENT)
Dept: RADIOLOGY | Facility: HOSPITAL | Age: 88
Discharge: HOME | End: 2023-06-15
Attending: INTERNAL MEDICINE
Payer: MEDICARE

## 2023-06-15 DIAGNOSIS — Z17.0 MALIGNANT NEOPLASM OF BREAST IN FEMALE, ESTROGEN RECEPTOR POSITIVE, UNSPECIFIED LATERALITY, UNSPECIFIED SITE OF BREAST: ICD-10-CM

## 2023-06-15 DIAGNOSIS — C79.2 SECONDARY MALIGNANT NEOPLASM OF SKIN: ICD-10-CM

## 2023-06-15 DIAGNOSIS — C50.919 CARCINOMA OF BREAST METASTATIC TO BONE, UNSPECIFIED LATERALITY: ICD-10-CM

## 2023-06-15 DIAGNOSIS — C50.919 MALIGNANT NEOPLASM OF BREAST IN FEMALE, ESTROGEN RECEPTOR POSITIVE, UNSPECIFIED LATERALITY, UNSPECIFIED SITE OF BREAST: ICD-10-CM

## 2023-06-15 DIAGNOSIS — C79.51 CARCINOMA OF BREAST METASTATIC TO BONE, UNSPECIFIED LATERALITY: ICD-10-CM

## 2023-06-15 PROCEDURE — A9552 F18 FDG: HCPCS

## 2023-06-15 PROCEDURE — 78815 PET IMAGE W/CT SKULL-THIGH: CPT | Mod: TC

## 2023-06-20 ENCOUNTER — OFFICE VISIT (OUTPATIENT)
Dept: HEMATOLOGY/ONCOLOGY | Facility: CLINIC | Age: 88
End: 2023-06-20
Payer: MEDICARE

## 2023-06-20 VITALS
SYSTOLIC BLOOD PRESSURE: 110 MMHG | BODY MASS INDEX: 26.75 KG/M2 | HEART RATE: 96 BPM | WEIGHT: 151 LBS | RESPIRATION RATE: 14 BRPM | OXYGEN SATURATION: 97 % | DIASTOLIC BLOOD PRESSURE: 79 MMHG | TEMPERATURE: 98 F | HEIGHT: 63 IN

## 2023-06-20 DIAGNOSIS — C50.919 MALIGNANT NEOPLASM OF BREAST IN FEMALE, ESTROGEN RECEPTOR POSITIVE, UNSPECIFIED LATERALITY, UNSPECIFIED SITE OF BREAST: ICD-10-CM

## 2023-06-20 DIAGNOSIS — C50.919 CARCINOMA OF BREAST METASTATIC TO BONE, UNSPECIFIED LATERALITY: Primary | ICD-10-CM

## 2023-06-20 DIAGNOSIS — C79.51 CARCINOMA OF BREAST METASTATIC TO BONE, UNSPECIFIED LATERALITY: Primary | ICD-10-CM

## 2023-06-20 DIAGNOSIS — Z17.0 MALIGNANT NEOPLASM OF BREAST IN FEMALE, ESTROGEN RECEPTOR POSITIVE, UNSPECIFIED LATERALITY, UNSPECIFIED SITE OF BREAST: ICD-10-CM

## 2023-06-20 PROCEDURE — 99999 PR PBB SHADOW E&M-EST. PATIENT-LVL V: CPT | Mod: PBBFAC,,, | Performed by: INTERNAL MEDICINE

## 2023-06-20 PROCEDURE — 3288F PR FALLS RISK ASSESSMENT DOCUMENTED: ICD-10-PCS | Mod: CPTII,S$GLB,, | Performed by: INTERNAL MEDICINE

## 2023-06-20 PROCEDURE — 1125F PR PAIN SEVERITY QUANTIFIED, PAIN PRESENT: ICD-10-PCS | Mod: CPTII,S$GLB,, | Performed by: INTERNAL MEDICINE

## 2023-06-20 PROCEDURE — 1159F MED LIST DOCD IN RCRD: CPT | Mod: CPTII,S$GLB,, | Performed by: INTERNAL MEDICINE

## 2023-06-20 PROCEDURE — 99214 PR OFFICE/OUTPT VISIT, EST, LEVL IV, 30-39 MIN: ICD-10-PCS | Mod: S$GLB,,, | Performed by: INTERNAL MEDICINE

## 2023-06-20 PROCEDURE — 1125F AMNT PAIN NOTED PAIN PRSNT: CPT | Mod: CPTII,S$GLB,, | Performed by: INTERNAL MEDICINE

## 2023-06-20 PROCEDURE — 1160F RVW MEDS BY RX/DR IN RCRD: CPT | Mod: CPTII,S$GLB,, | Performed by: INTERNAL MEDICINE

## 2023-06-20 PROCEDURE — 1100F PTFALLS ASSESS-DOCD GE2>/YR: CPT | Mod: CPTII,S$GLB,, | Performed by: INTERNAL MEDICINE

## 2023-06-20 PROCEDURE — 1159F PR MEDICATION LIST DOCUMENTED IN MEDICAL RECORD: ICD-10-PCS | Mod: CPTII,S$GLB,, | Performed by: INTERNAL MEDICINE

## 2023-06-20 PROCEDURE — 3288F FALL RISK ASSESSMENT DOCD: CPT | Mod: CPTII,S$GLB,, | Performed by: INTERNAL MEDICINE

## 2023-06-20 PROCEDURE — 1160F PR REVIEW ALL MEDS BY PRESCRIBER/CLIN PHARMACIST DOCUMENTED: ICD-10-PCS | Mod: CPTII,S$GLB,, | Performed by: INTERNAL MEDICINE

## 2023-06-20 PROCEDURE — 99999 PR PBB SHADOW E&M-EST. PATIENT-LVL V: ICD-10-PCS | Mod: PBBFAC,,, | Performed by: INTERNAL MEDICINE

## 2023-06-20 PROCEDURE — 1100F PR PT FALLS ASSESS DOC 2+ FALLS/FALL W/INJURY/YR: ICD-10-PCS | Mod: CPTII,S$GLB,, | Performed by: INTERNAL MEDICINE

## 2023-06-20 PROCEDURE — 99214 OFFICE O/P EST MOD 30 MIN: CPT | Mod: S$GLB,,, | Performed by: INTERNAL MEDICINE

## 2023-06-20 RX ORDER — FLUTICASONE FUROATE AND VILANTEROL TRIFENATATE 200; 25 UG/1; UG/1
POWDER RESPIRATORY (INHALATION)
COMMUNITY
Start: 2023-06-13

## 2023-06-20 RX ORDER — FLUTICASONE PROPIONATE 50 MCG
SPRAY, SUSPENSION (ML) NASAL
COMMUNITY
Start: 2023-06-13

## 2023-06-20 RX ORDER — DOCUSATE SODIUM 100 MG/1
100 CAPSULE, LIQUID FILLED ORAL
COMMUNITY

## 2023-06-20 RX ORDER — TRAMADOL HYDROCHLORIDE 50 MG/1
50 TABLET ORAL EVERY 6 HOURS PRN
COMMUNITY
Start: 2023-06-09

## 2023-06-29 ENCOUNTER — PATIENT MESSAGE (OUTPATIENT)
Dept: HEMATOLOGY/ONCOLOGY | Facility: CLINIC | Age: 88
End: 2023-06-29
Payer: MEDICARE

## 2023-06-29 DIAGNOSIS — C79.51 CARCINOMA OF LEFT BREAST METASTATIC TO BONE: ICD-10-CM

## 2023-06-29 DIAGNOSIS — C50.912 CARCINOMA OF LEFT BREAST METASTATIC TO BONE: ICD-10-CM

## 2023-06-29 RX ORDER — ANASTROZOLE 1 MG/1
1 TABLET ORAL DAILY
Qty: 90 TABLET | Refills: 1 | Status: SHIPPED | OUTPATIENT
Start: 2023-06-29 | End: 2024-01-02

## 2023-12-26 DIAGNOSIS — C50.919 MALIGNANT NEOPLASM OF BREAST IN FEMALE, ESTROGEN RECEPTOR POSITIVE, UNSPECIFIED LATERALITY, UNSPECIFIED SITE OF BREAST: Primary | ICD-10-CM

## 2023-12-26 DIAGNOSIS — M85.80 OSTEOPENIA, UNSPECIFIED LOCATION: ICD-10-CM

## 2023-12-26 DIAGNOSIS — C79.2 SECONDARY MALIGNANT NEOPLASM OF SKIN: ICD-10-CM

## 2023-12-26 DIAGNOSIS — Z17.0 MALIGNANT NEOPLASM OF BREAST IN FEMALE, ESTROGEN RECEPTOR POSITIVE, UNSPECIFIED LATERALITY, UNSPECIFIED SITE OF BREAST: Primary | ICD-10-CM

## 2024-01-01 DIAGNOSIS — C50.912 CARCINOMA OF LEFT BREAST METASTATIC TO BONE: ICD-10-CM

## 2024-01-01 DIAGNOSIS — C79.51 CARCINOMA OF LEFT BREAST METASTATIC TO BONE: ICD-10-CM

## 2024-01-02 RX ORDER — ANASTROZOLE 1 MG/1
1 TABLET ORAL
Qty: 90 TABLET | Refills: 0 | Status: SHIPPED | OUTPATIENT
Start: 2024-01-02

## 2024-01-03 ENCOUNTER — HOSPITAL ENCOUNTER (OUTPATIENT)
Dept: RADIOLOGY | Facility: HOSPITAL | Age: 89
Discharge: HOME | End: 2024-01-03
Attending: INTERNAL MEDICINE
Payer: MEDICARE

## 2024-01-03 DIAGNOSIS — C50.919 MALIGNANT NEOPLASM OF BREAST IN FEMALE, ESTROGEN RECEPTOR POSITIVE, UNSPECIFIED LATERALITY, UNSPECIFIED SITE OF BREAST: ICD-10-CM

## 2024-01-03 DIAGNOSIS — C79.51 CARCINOMA OF BREAST METASTATIC TO BONE, UNSPECIFIED LATERALITY: ICD-10-CM

## 2024-01-03 DIAGNOSIS — Z17.0 MALIGNANT NEOPLASM OF BREAST IN FEMALE, ESTROGEN RECEPTOR POSITIVE, UNSPECIFIED LATERALITY, UNSPECIFIED SITE OF BREAST: ICD-10-CM

## 2024-01-03 DIAGNOSIS — C50.919 CARCINOMA OF BREAST METASTATIC TO BONE, UNSPECIFIED LATERALITY: ICD-10-CM

## 2024-01-03 PROCEDURE — 78815 PET IMAGE W/CT SKULL-THIGH: CPT | Mod: TC,PS

## 2024-01-03 PROCEDURE — A9552 F18 FDG: HCPCS

## 2024-01-09 NOTE — PROGRESS NOTES
Subjective:       Patient ID: Darby Chavez is a 88 y.o. female.    Chief Complaint: Follow-up (Patient has no concerns today)      Diagnosis:  Right-sided breast cancer in the late 1990s/early 2000s status post mastectomy.  Patient unsure of surgeon's name, unsure of receptor status, did not receive chemotherapy or radiation.   Breast cancer of the left breast 2 years after initial right breast cancer, also status post mastectomy with no chemo or radiation, details unknown.    Metastatic breast cancer of the skin at the site of her left mastectomy scar diagnosed 06/01/2022    Current Treatment:   1. Anastrozole     Treatment History:  1. Right mastectomy in the late 1990s/early 2000s, left mastectomy 2 years later.    HPI:  Patient who was diagnosed with right-sided breast cancer in the late 1990s/early 2000s, status post mastectomy of the right breast.  She did not receive any chemo or radiation. Two years later, she was diagnosed with left-sided breast cancer, also underwent left-sided mastectomy without chemotherapy or radiation.  In 2022, she noticed a nodule/rash at the site of her left mastectomy scar.  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  He was referred Oncology.  I saw her on 07/05/2022.  At that visit, she stated that she has oozing at the site of her metastatic cancer.  She stated that it does not hurt, in only causes problems when she hits it.  I requested that receptor studies be done on her biopsy, this returned ER/UT positive and HER2 negative. PET/CT scan done on 8/8/2022 revealed hypermetabolic left anterior chest wall mass with hypermetabolic left retropectoral lymph nodes and metastasis at the left 11th costovertebral junction and posterior rib.  No other evidence of metastatic disease.  Completed radiation on 09/19/2022.  DEXA scan done on 10/11/2022 showed  osteopenia.  Most recent PET/CT scan on 01/03/2024 showed stable hypermetabolism of the left 11th costovertebral junction with suspected posttraumatic uptake within the T12 vertebral body that has decreased and no new sites of tracer avid metastatic disease.      Interval History:   Patient presents to clinic for scheduled follow up appointment.  Overall, she is doing well.      Past Medical History:   Diagnosis Date    Anxiety     Arthritis     Breast cancer     CAD (coronary artery disease)     Hyperlipidemia     Hypertension     Mild cognitive impairment     Restless legs syndrome       Past Surgical History:   Procedure Laterality Date    BREAST SURGERY  2000    CATARACT EXTRACTION      COLONOSCOPY      CORONARY STENT PLACEMENT      EYE SURGERY  2018    HYSTERECTOMY      MASTECTOMY Bilateral     TONSILLECTOMY      UTERINE SUSPENSION      WRIST SURGERY Right      Social History     Socioeconomic History    Marital status:    Occupational History    Occupation: Retired   Tobacco Use    Smoking status: Former     Current packs/day: 1.00     Average packs/day: 1 pack/day for 10.0 years (10.0 ttl pk-yrs)     Types: Cigarettes    Smokeless tobacco: Never   Substance and Sexual Activity    Alcohol use: Not Currently    Drug use: Never      Family History   Problem Relation Age of Onset    Breast cancer Maternal Aunt     Breast cancer Maternal Aunt     Heart disease Father       Review of patient's allergies indicates:   Allergen Reactions    Sulfa (sulfonamide antibiotics)      Other reaction(s): itching      Review of Systems   Constitutional:  Negative for appetite change and unexpected weight change.   HENT:  Negative for mouth sores.    Eyes:  Negative for visual disturbance.   Respiratory:  Positive for cough. Negative for shortness of breath.    Cardiovascular:  Negative for chest pain.   Gastrointestinal:  Negative for abdominal pain and diarrhea.   Genitourinary:  Negative for frequency.    Musculoskeletal:  Positive for back pain.   Integumentary:  Negative for rash.   Neurological:  Negative for headaches.   Hematological:  Negative for adenopathy.   Psychiatric/Behavioral:  The patient is not nervous/anxious.          Objective:      Physical Exam  Vitals reviewed.   Constitutional:       General: She is awake.      Appearance: Normal appearance. She is well-developed.   HENT:      Head: Normocephalic and atraumatic.   Eyes:      General: Lids are normal. Vision grossly intact.      Extraocular Movements: Extraocular movements intact.      Conjunctiva/sclera: Conjunctivae normal.   Cardiovascular:      Rate and Rhythm: Normal rate and regular rhythm.      Pulses: Normal pulses.      Heart sounds: Normal heart sounds.   Pulmonary:      Effort: Pulmonary effort is normal.      Breath sounds: Normal breath sounds.   Musculoskeletal:      Cervical back: Full passive range of motion without pain.      Right lower leg: No edema.      Left lower leg: No edema.   Lymphadenopathy:      Cervical: No cervical adenopathy.   Skin:     General: Skin is warm and dry.   Neurological:      General: No focal deficit present.      Mental Status: She is alert and oriented to person, place, and time.   Psychiatric:         Attention and Perception: Attention and perception normal.         Behavior: Behavior is cooperative.         LABS AND IMAGES REVIEWED IN EPIC          Assessment:     Right-sided breast cancer in the late 1990s/early 2000s status post mastectomy.  Patient unsure of surgeon's name, unsure of receptor status, did not receive chemotherapy or radiation.   Breast cancer of the left breast 2 years after initial right breast cancer, also status post mastectomy with no chemo or radiation, details unknown.    Metastatic breast cancer of the skin at the site of her left mastectomy scar diagnosed 06/01/2022, ER/PA positive, HER2 negative  Osteopenia        Plan:       Patient with history of right-sided  breast cancer in the late 1990s/early 2000s with left-sided breast cancer that occurred 2 years later.  She now has recurrence at the site of her left mastectomy scar.    This returned ER/[place] positive and HER2 negative.    PET/CT scan done on 08/08/2022 revealed left anterior chest wall mass with left retropectoral nodes and a left 11th rib lesion.  No other metastatic disease.    She was seen by Radiation Oncology for radiation and completed radiation therapy on 09/19/2022.    Patient on anastrozole due to hormone receptor positivity.    We could consider a CDK4/6 inhibitor, but due to her advanced age, we will continue with aromatase inhibitor alone.  PET scan on 01/03/2024 showed stable disease.    DEXA scan done on 10/11/2022 did show osteopenia.  The patient does wear dentures, and these sits directly on her bone.  I worry that if we used a bisphosphonate or denosumab, she would be very high risk for osteonecrosis of the jaw.  She will continue 2000 units of vitamin-D daily and continue calcium supplementation.    Return to clinic in 6 months with labs and PET/CT scan prior.      Labs:CBC, CMP, CEA, CA 15-3    Kendell Bhatt II, MD    I, Thais Ramos LPN, acted solely as a scribe for and in the presence of, Dr. Kendell Bhatt who performed the service.

## 2024-01-10 ENCOUNTER — OFFICE VISIT (OUTPATIENT)
Dept: HEMATOLOGY/ONCOLOGY | Facility: CLINIC | Age: 89
End: 2024-01-10
Payer: MEDICARE

## 2024-01-10 VITALS
SYSTOLIC BLOOD PRESSURE: 142 MMHG | WEIGHT: 145.81 LBS | DIASTOLIC BLOOD PRESSURE: 90 MMHG | HEIGHT: 63 IN | OXYGEN SATURATION: 97 % | RESPIRATION RATE: 18 BRPM | HEART RATE: 90 BPM | BODY MASS INDEX: 25.84 KG/M2

## 2024-01-10 DIAGNOSIS — Z17.0 MALIGNANT NEOPLASM OF BREAST IN FEMALE, ESTROGEN RECEPTOR POSITIVE, UNSPECIFIED LATERALITY, UNSPECIFIED SITE OF BREAST: Primary | ICD-10-CM

## 2024-01-10 DIAGNOSIS — C50.919 MALIGNANT NEOPLASM OF BREAST IN FEMALE, ESTROGEN RECEPTOR POSITIVE, UNSPECIFIED LATERALITY, UNSPECIFIED SITE OF BREAST: Primary | ICD-10-CM

## 2024-01-10 DIAGNOSIS — C50.919 CARCINOMA OF BREAST METASTATIC TO BONE, UNSPECIFIED LATERALITY: ICD-10-CM

## 2024-01-10 DIAGNOSIS — C79.51 CARCINOMA OF BREAST METASTATIC TO BONE, UNSPECIFIED LATERALITY: ICD-10-CM

## 2024-01-10 DIAGNOSIS — C79.2 SECONDARY MALIGNANT NEOPLASM OF SKIN: ICD-10-CM

## 2024-01-10 PROCEDURE — 1159F MED LIST DOCD IN RCRD: CPT | Mod: CPTII,S$GLB,, | Performed by: INTERNAL MEDICINE

## 2024-01-10 PROCEDURE — 1160F RVW MEDS BY RX/DR IN RCRD: CPT | Mod: CPTII,S$GLB,, | Performed by: INTERNAL MEDICINE

## 2024-01-10 PROCEDURE — 3288F FALL RISK ASSESSMENT DOCD: CPT | Mod: CPTII,S$GLB,, | Performed by: INTERNAL MEDICINE

## 2024-01-10 PROCEDURE — 1101F PT FALLS ASSESS-DOCD LE1/YR: CPT | Mod: CPTII,S$GLB,, | Performed by: INTERNAL MEDICINE

## 2024-01-10 PROCEDURE — 99999 PR PBB SHADOW E&M-EST. PATIENT-LVL V: CPT | Mod: PBBFAC,,, | Performed by: INTERNAL MEDICINE

## 2024-01-10 PROCEDURE — 99214 OFFICE O/P EST MOD 30 MIN: CPT | Mod: S$GLB,,, | Performed by: INTERNAL MEDICINE

## 2024-01-10 RX ORDER — ALBUTEROL SULFATE 90 UG/1
2 AEROSOL, METERED RESPIRATORY (INHALATION)
COMMUNITY
Start: 2023-12-09

## 2024-04-04 DIAGNOSIS — C79.51 CARCINOMA OF LEFT BREAST METASTATIC TO BONE: ICD-10-CM

## 2024-04-04 DIAGNOSIS — C50.912 CARCINOMA OF LEFT BREAST METASTATIC TO BONE: ICD-10-CM

## 2024-04-04 RX ORDER — ANASTROZOLE 1 MG/1
1 TABLET ORAL DAILY
Qty: 90 TABLET | Refills: 1 | Status: SHIPPED | OUTPATIENT
Start: 2024-04-04

## 2024-04-17 ENCOUNTER — OFFICE VISIT (OUTPATIENT)
Dept: NEUROLOGY | Facility: CLINIC | Age: 89
End: 2024-04-17
Payer: MEDICARE

## 2024-04-17 VITALS
WEIGHT: 149.19 LBS | BODY MASS INDEX: 26.43 KG/M2 | SYSTOLIC BLOOD PRESSURE: 169 MMHG | DIASTOLIC BLOOD PRESSURE: 104 MMHG | HEIGHT: 63 IN | HEART RATE: 78 BPM

## 2024-04-17 DIAGNOSIS — G31.84 MILD COGNITIVE IMPAIRMENT: Primary | ICD-10-CM

## 2024-04-17 DIAGNOSIS — F03.A0 MILD DEMENTIA WITHOUT BEHAVIORAL DISTURBANCE, PSYCHOTIC DISTURBANCE, MOOD DISTURBANCE, OR ANXIETY, UNSPECIFIED DEMENTIA TYPE: ICD-10-CM

## 2024-04-17 PROCEDURE — 1101F PT FALLS ASSESS-DOCD LE1/YR: CPT | Mod: CPTII,S$GLB,, | Performed by: PSYCHIATRY & NEUROLOGY

## 2024-04-17 PROCEDURE — 99214 OFFICE O/P EST MOD 30 MIN: CPT | Mod: S$GLB,,, | Performed by: PSYCHIATRY & NEUROLOGY

## 2024-04-17 PROCEDURE — 1159F MED LIST DOCD IN RCRD: CPT | Mod: CPTII,S$GLB,, | Performed by: PSYCHIATRY & NEUROLOGY

## 2024-04-17 PROCEDURE — 3288F FALL RISK ASSESSMENT DOCD: CPT | Mod: CPTII,S$GLB,, | Performed by: PSYCHIATRY & NEUROLOGY

## 2024-04-17 PROCEDURE — 99999 PR PBB SHADOW E&M-EST. PATIENT-LVL IV: CPT | Mod: PBBFAC,,, | Performed by: PSYCHIATRY & NEUROLOGY

## 2024-04-17 RX ORDER — MEMANTINE HYDROCHLORIDE 5 MG/1
TABLET ORAL
Qty: 120 TABLET | Refills: 11 | Status: SHIPPED | OUTPATIENT
Start: 2024-04-17

## 2024-04-17 NOTE — PROGRESS NOTES
Chief Complaint   Patient presents with    Mild cognitive impairment     PT states doing well denies difficulty with mood/sleep/appetite         This is a 89 y.o. female here for follow up for cognitive decline.  She did have a fall last June.  She now uses a walker for balance.  Otherwise all patients medical issues are stable and medications have not changed.  She has not been hospitalized.  Caregiver notes that patient does forget names and recent events from time to time but overall she is not concerned about a significant decline.    Medication List with Changes/Refills   New Medications    MEMANTINE (NAMENDA) 5 MG TAB    1 PO Q PM for 1 wk, then 1 PO BID for 1 week, then 1 PO Q AM, 2 PO Q PM for 1 wk, then 2 PO BID thereafter   Current Medications    ALBUTEROL (PROVENTIL/VENTOLIN HFA) 90 MCG/ACTUATION INHALER    2 puffs every 4 to 6 hours as needed.    ANASTROZOLE (ARIMIDEX) 1 MG TAB    Take 1 tablet (1 mg total) by mouth once daily.    ASCORBIC ACID, VITAMIN C, (VITAMIN C) 250 MG TABLET    Take 250 mg by mouth once daily.    ASPIRIN (ECOTRIN) 81 MG EC TABLET    Take 81 mg by mouth once daily.    BREO ELLIPTA 200-25 MCG/DOSE DSDV DISKUS INHALER    inhale ONE PUFF BY MOUTH DAILY    CALCIUM CARBONATE (OS-KAREN) 500 MG CALCIUM (1,250 MG) TABLET    Take 1 tablet by mouth once daily.    CYANOCOBALAMIN (VITAMIN B-12) 1000 MCG TABLET    Take 100 mcg by mouth every other day.    DOCUSATE SODIUM (COLACE) 100 MG CAPSULE    Take 100 mg by mouth as needed for Constipation.    DONEPEZIL (ARICEPT) 5 MG TABLET    Take 1 tablet (5 mg total) by mouth 2 (two) times a day.    ESOMEPRAZOLE (NEXIUM) 40 MG CAPSULE    Take 40 mg by mouth once daily.    FLUTICASONE PROPIONATE (FLONASE) 50 MCG/ACTUATION NASAL SPRAY    SPRAY TWO SPRAYS INTO EACH NOSTRIL EVERY DAY    LEVOCETIRIZINE (XYZAL) 5 MG TABLET    Take 5 mg by mouth daily as needed.    LOSARTAN (COZAAR) 25 MG TABLET    Take 25 mg by mouth once daily.    MONTELUKAST (SINGULAIR) 10  MG TABLET    Take 10 mg by mouth once daily.    MULTIVITAMIN/IRON/FOLIC ACID (CENTRUM COMPLETE ORAL)    Take 1 tablet by mouth Daily.    NITROGLYCERIN (NITROSTAT) 0.4 MG SL TABLET    Place under the tongue.    PRAVASTATIN (PRAVACHOL) 20 MG TABLET    Take 20 mg by mouth once daily.    TOLTERODINE (DETROL) 1 MG TAB    Take 1 mg by mouth 2 (two) times daily.    VENLAFAXINE (EFFEXOR-XR) 37.5 MG 24 HR CAPSULE    Take 37.5 mg by mouth once daily.    VIT A/VIT C/VIT E/ZINC/COPPER (PRESERVISION AREDS ORAL)    Take by mouth.    VIT C,U-CV-EFRIH-LUTEIN-ZEAXAN (PRESERVISION AREDS-2) 250-90-40-1 MG CAP    Take 1 capsule by mouth once daily at 6am.    VITAMIN D (VITAMIN D3) 1000 UNITS TAB    Take 1,000 Units by mouth once daily.   Discontinued Medications    TRAMADOL (ULTRAM) 50 MG TABLET    Take 50 mg by mouth every 6 (six) hours as needed.        Vitals:    04/17/24 0858   BP: (!) 169/104   Pulse: 78        General: alert and oriented, no acute distress, no audible wheezes, pulse intact, no edema    Cognition and Comprehension  Speech and language intact  Follows commands  Speech fluent  Attention intact  Memory for recent events poor from history taking  Affect pleasant  Fund of knowledge adequate    MOCA 20/30    Cranial nerves  II. Optic: Visual fields full to confrontation both eyes  III, IV, VI. Oculomotor: Intact, Pupils equal, round and reactive to light, no nystagmus  V. Trigeminal: sensation to light touch normal  VII. No facial asymmetry or facial weakness  VIII. Hearing intact to spoken voice  IX/X. Glossopharyngeal/Vagus: Voice normal, palate rises symmetrically  XI. Axillary: Shoulder shrug normal  XII. Hypoglossal: Intact    Muscle Strength and Tone  Normal upper extremity tone  Normal lower extremity tone  Normal upper extremity strength  Normal lower extremity strength    Sensation  Intact to light touch and temperature    Reflexes  Normal and symmetric    Coordination and Gait  Finger to nose normal  Gait  normal     1. Mild cognitive impairment  Overview:  Established in May 2018 for STM problems that started about a year prior following hospital stay for ischemic colitis. CT head 2021 chronic ischemia.  She is on aricept 5 mg BID    MOCA scores:  05/2018: 24/30 11/2018: 25/30 05/2019: 25/30 06/2021: 21/30 08/2022: 22/30      2. Mild dementia without behavioral disturbance, psychotic disturbance, mood disturbance, or anxiety, unspecified dementia type  -     memantine (NAMENDA) 5 MG Tab; 1 PO Q PM for 1 wk, then 1 PO BID for 1 week, then 1 PO Q AM, 2 PO Q PM for 1 wk, then 2 PO BID thereafter  Dispense: 120 tablet; Refill: 11     Some decline noted  Add memantine  BP elevated but has not taken morning meds

## 2024-06-17 DIAGNOSIS — R41.3 MEMORY LOSS: ICD-10-CM

## 2024-06-17 RX ORDER — DONEPEZIL HYDROCHLORIDE 5 MG/1
5 TABLET, FILM COATED ORAL 2 TIMES DAILY
Qty: 60 TABLET | Refills: 11 | Status: SHIPPED | OUTPATIENT
Start: 2024-06-17 | End: 2025-06-17

## 2024-07-08 ENCOUNTER — HOSPITAL ENCOUNTER (OUTPATIENT)
Dept: RADIOLOGY | Facility: HOSPITAL | Age: 89
Discharge: HOME | End: 2024-07-08
Attending: INTERNAL MEDICINE
Payer: MEDICARE

## 2024-07-08 DIAGNOSIS — C79.2 SECONDARY MALIGNANT NEOPLASM OF SKIN: ICD-10-CM

## 2024-07-08 DIAGNOSIS — C79.51 CARCINOMA OF BREAST METASTATIC TO BONE, UNSPECIFIED LATERALITY: ICD-10-CM

## 2024-07-08 DIAGNOSIS — Z17.0 MALIGNANT NEOPLASM OF BREAST IN FEMALE, ESTROGEN RECEPTOR POSITIVE, UNSPECIFIED LATERALITY, UNSPECIFIED SITE OF BREAST: ICD-10-CM

## 2024-07-08 DIAGNOSIS — C50.919 MALIGNANT NEOPLASM OF BREAST IN FEMALE, ESTROGEN RECEPTOR POSITIVE, UNSPECIFIED LATERALITY, UNSPECIFIED SITE OF BREAST: ICD-10-CM

## 2024-07-08 DIAGNOSIS — C50.919 CARCINOMA OF BREAST METASTATIC TO BONE, UNSPECIFIED LATERALITY: ICD-10-CM

## 2024-07-08 PROCEDURE — A9552 F18 FDG: HCPCS | Performed by: INTERNAL MEDICINE

## 2024-07-08 PROCEDURE — 78815 PET IMAGE W/CT SKULL-THIGH: CPT | Mod: TC

## 2024-07-08 RX ORDER — FLUDEOXYGLUCOSE F18 500 MCI/ML
10 INJECTION INTRAVENOUS
Status: COMPLETED | OUTPATIENT
Start: 2024-07-08 | End: 2024-07-08

## 2024-07-08 RX ADMIN — FLUDEOXYGLUCOSE F-18 10.4 MILLICURIE: 500 INJECTION INTRAVENOUS at 09:07

## 2024-07-09 NOTE — PROGRESS NOTES
Subjective:       Patient ID: Darby Chavez is a 89 y.o. female.    Chief Complaint: Follow-up (Pt has no concerns today)      Diagnosis:  Right-sided breast cancer in the late 1990s/early 2000s status post mastectomy.  Patient unsure of surgeon's name, unsure of receptor status, did not receive chemotherapy or radiation.   Breast cancer of the left breast 2 years after initial right breast cancer, also status post mastectomy with no chemo or radiation, details unknown.    Metastatic breast cancer of the skin at the site of her left mastectomy scar diagnosed 06/01/2022    Current Treatment:   1. Anastrozole     Treatment History:  1. Right mastectomy in the late 1990s/early 2000s, left mastectomy 2 years later.    HPI:  Patient who was diagnosed with right-sided breast cancer in the late 1990s/early 2000s, status post mastectomy of the right breast.  She did not receive any chemo or radiation. Two years later, she was diagnosed with left-sided breast cancer, also underwent left-sided mastectomy without chemotherapy or radiation.  In 2022, she noticed a nodule/rash at the site of her left mastectomy scar.  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  He was referred Oncology.  I saw her on 07/05/2022.  At that visit, she stated that she has oozing at the site of her metastatic cancer.  She stated that it does not hurt, in only causes problems when she hits it.  I requested that receptor studies be done on her biopsy, this returned ER/AK positive and HER2 negative. PET/CT scan done on 8/8/2022 revealed hypermetabolic left anterior chest wall mass with hypermetabolic left retropectoral lymph nodes and metastasis at the left 11th costovertebral junction and posterior rib.  No other evidence of metastatic disease.  Completed radiation on 09/19/2022.  DEXA scan done on 10/11/2022 showed  osteopenia.  Most recent PET/CT scan on 01/03/2024 showed stable hypermetabolism of the left 11th costovertebral junction with suspected posttraumatic uptake within the T12 vertebral body that has decreased and no new sites of tracer avid metastatic disease. PET/CT scan done on 07/08/2024 showed new FDG activity at T10 vertebral body with mild associated vertebral height loss, not convincingly a pathologic fracture.  Otherwise there was little interval change when compared to prior.    Interval History:   Patient presents to clinic for scheduled follow up appointment.  Overall, she is doing well.  She did have some mild back pain, and that may be in the area of her new likely compression fracture.  Having said that, the patient tried to move recliner in this is likely the inciting event.  Her blood pressure is elevated today.  She was recently taken off of her blood pressure medications.  She has no other issues.    Past Medical History:   Diagnosis Date    Anxiety     Arthritis     Breast cancer     CAD (coronary artery disease)     Hyperlipidemia     Hypertension     Mild cognitive impairment     Restless legs syndrome       Past Surgical History:   Procedure Laterality Date    BREAST SURGERY  2000    CATARACT EXTRACTION      COLONOSCOPY      CORONARY STENT PLACEMENT      EYE SURGERY  2018    HYSTERECTOMY      MASTECTOMY Bilateral     TONSILLECTOMY      UTERINE SUSPENSION      WRIST SURGERY Right      Social History     Socioeconomic History    Marital status:    Occupational History    Occupation: Retired   Tobacco Use    Smoking status: Former     Current packs/day: 1.00     Average packs/day: 1 pack/day for 10.0 years (10.0 ttl pk-yrs)     Types: Cigarettes    Smokeless tobacco: Never   Substance and Sexual Activity    Alcohol use: Not Currently    Drug use: Never      Family History   Problem Relation Name Age of Onset    Breast cancer Maternal Aunt      Breast cancer Maternal Aunt      Heart disease  Father Pop       Review of patient's allergies indicates:   Allergen Reactions    Sulfa (sulfonamide antibiotics)      Other reaction(s): itching      Review of Systems   Constitutional:  Negative for appetite change and unexpected weight change.   HENT:  Negative for mouth sores.    Eyes:  Negative for visual disturbance.   Respiratory:  Positive for cough. Negative for shortness of breath.    Cardiovascular:  Negative for chest pain.   Gastrointestinal:  Negative for abdominal pain and diarrhea.   Genitourinary:  Negative for frequency.   Musculoskeletal:  Positive for back pain.   Integumentary:  Negative for rash.   Neurological:  Negative for headaches.   Hematological:  Negative for adenopathy.   Psychiatric/Behavioral:  The patient is not nervous/anxious.          Objective:      Physical Exam  Vitals reviewed.   Constitutional:       General: She is awake.      Appearance: Normal appearance. She is well-developed.   HENT:      Head: Normocephalic and atraumatic.   Eyes:      General: Lids are normal. Vision grossly intact.      Extraocular Movements: Extraocular movements intact.      Conjunctiva/sclera: Conjunctivae normal.   Cardiovascular:      Rate and Rhythm: Normal rate and regular rhythm.      Pulses: Normal pulses.      Heart sounds: Normal heart sounds.   Pulmonary:      Effort: Pulmonary effort is normal.      Breath sounds: Normal breath sounds.   Musculoskeletal:      Cervical back: Full passive range of motion without pain.      Right lower leg: No edema.      Left lower leg: No edema.   Lymphadenopathy:      Cervical: No cervical adenopathy.   Skin:     General: Skin is warm and dry.   Neurological:      General: No focal deficit present.      Mental Status: She is alert and oriented to person, place, and time.   Psychiatric:         Attention and Perception: Attention and perception normal.         Behavior: Behavior is cooperative.         LABS AND IMAGES REVIEWED IN EPIC          Assessment:      Right-sided breast cancer in the late 1990s/early 2000s status post mastectomy.  Patient unsure of surgeon's name, unsure of receptor status, did not receive chemotherapy or radiation.   Breast cancer of the left breast 2 years after initial right breast cancer, also status post mastectomy with no chemo or radiation, details unknown.    Metastatic breast cancer of the skin at the site of her left mastectomy scar diagnosed 06/01/2022, ER/SC positive, HER2 negative  Osteopenia        Plan:       Patient with history of right-sided breast cancer in the late 1990s/early 2000s with left-sided breast cancer that occurred 2 years later.  She now has recurrence at the site of her left mastectomy scar.    This returned ER/SC positive and HER2 negative.    PET/CT scan done on 08/08/2022 revealed left anterior chest wall mass with left retropectoral nodes and a left 11th rib lesion.  No other metastatic disease.    She was seen by Radiation Oncology for radiation and completed radiation therapy on 09/19/2022.    Patient on anastrozole due to hormone receptor positivity.    We could consider a CDK4/6 inhibitor, but due to her advanced age, we will continue with aromatase inhibitor alone.  PET scan on 01/03/2024 showed stable disease.    DEXA scan done on 10/11/2022 did show osteopenia.  The patient does wear dentures, and these sits directly on her bone.  I worry that if we used a bisphosphonate or denosumab, she would be very high risk for osteonecrosis of the jaw.  She will continue 2000 units of vitamin-D daily and continue calcium supplementation.    PET/CT scan done on 07/08/2024 showed a new compression fracture with no evidence of new or worsening malignancy.    Return to clinic in 6 months with labs and PET/CT scan prior.      Labs:CBC, CMP, CEA, CA 15-3    Kendell Bhatt II, MD    I, Thais Ramos LPN, acted solely as a scribe for and in the presence of, Dr. Kendell Bhatt who performed the service.

## 2024-07-10 ENCOUNTER — OFFICE VISIT (OUTPATIENT)
Dept: HEMATOLOGY/ONCOLOGY | Facility: CLINIC | Age: 89
End: 2024-07-10
Payer: MEDICARE

## 2024-07-10 VITALS
WEIGHT: 148.06 LBS | HEIGHT: 63 IN | OXYGEN SATURATION: 98 % | SYSTOLIC BLOOD PRESSURE: 163 MMHG | BODY MASS INDEX: 26.23 KG/M2 | DIASTOLIC BLOOD PRESSURE: 101 MMHG | RESPIRATION RATE: 18 BRPM | HEART RATE: 97 BPM

## 2024-07-10 DIAGNOSIS — C50.919 MALIGNANT NEOPLASM OF BREAST IN FEMALE, ESTROGEN RECEPTOR POSITIVE, UNSPECIFIED LATERALITY, UNSPECIFIED SITE OF BREAST: ICD-10-CM

## 2024-07-10 DIAGNOSIS — C79.51 CARCINOMA OF BREAST METASTATIC TO BONE, UNSPECIFIED LATERALITY: Primary | ICD-10-CM

## 2024-07-10 DIAGNOSIS — C79.2 SECONDARY MALIGNANT NEOPLASM OF SKIN: ICD-10-CM

## 2024-07-10 DIAGNOSIS — C50.919 CARCINOMA OF BREAST METASTATIC TO BONE, UNSPECIFIED LATERALITY: Primary | ICD-10-CM

## 2024-07-10 DIAGNOSIS — Z17.0 MALIGNANT NEOPLASM OF BREAST IN FEMALE, ESTROGEN RECEPTOR POSITIVE, UNSPECIFIED LATERALITY, UNSPECIFIED SITE OF BREAST: ICD-10-CM

## 2024-07-10 PROCEDURE — 99214 OFFICE O/P EST MOD 30 MIN: CPT | Mod: S$GLB,,, | Performed by: INTERNAL MEDICINE

## 2024-07-10 PROCEDURE — 3288F FALL RISK ASSESSMENT DOCD: CPT | Mod: CPTII,S$GLB,, | Performed by: INTERNAL MEDICINE

## 2024-07-10 PROCEDURE — 1160F RVW MEDS BY RX/DR IN RCRD: CPT | Mod: CPTII,S$GLB,, | Performed by: INTERNAL MEDICINE

## 2024-07-10 PROCEDURE — 99999 PR PBB SHADOW E&M-EST. PATIENT-LVL V: CPT | Mod: PBBFAC,,, | Performed by: INTERNAL MEDICINE

## 2024-07-10 PROCEDURE — 1101F PT FALLS ASSESS-DOCD LE1/YR: CPT | Mod: CPTII,S$GLB,, | Performed by: INTERNAL MEDICINE

## 2024-07-10 PROCEDURE — 1159F MED LIST DOCD IN RCRD: CPT | Mod: CPTII,S$GLB,, | Performed by: INTERNAL MEDICINE

## 2024-10-07 DIAGNOSIS — C50.912 CARCINOMA OF LEFT BREAST METASTATIC TO BONE: ICD-10-CM

## 2024-10-07 DIAGNOSIS — C79.51 CARCINOMA OF LEFT BREAST METASTATIC TO BONE: ICD-10-CM

## 2024-10-07 RX ORDER — ANASTROZOLE 1 MG/1
1 TABLET ORAL
Qty: 90 TABLET | Refills: 1 | Status: SHIPPED | OUTPATIENT
Start: 2024-10-07

## 2024-12-10 DIAGNOSIS — R41.3 MEMORY LOSS: ICD-10-CM

## 2024-12-10 RX ORDER — DONEPEZIL HYDROCHLORIDE 5 MG/1
5 TABLET, FILM COATED ORAL 2 TIMES DAILY
Qty: 60 TABLET | Refills: 5 | Status: SHIPPED | OUTPATIENT
Start: 2024-12-10 | End: 2025-12-10

## 2024-12-20 ENCOUNTER — TELEPHONE (OUTPATIENT)
Dept: NEUROLOGY | Facility: CLINIC | Age: 89
End: 2024-12-20
Payer: MEDICARE

## 2024-12-20 NOTE — TELEPHONE ENCOUNTER
Pt's sister Luci called stating since starting namenda about six weeks ago because she was unaware the Pt was supposed to be taking both aricept and namenda has noticed she is more out of it a lot of brain fog would like to d/c medication informed she can stop medication does not need to wean down and to please let office know if there are any other changes.

## 2025-01-08 ENCOUNTER — HOSPITAL ENCOUNTER (OUTPATIENT)
Dept: RADIOLOGY | Facility: HOSPITAL | Age: 89
Discharge: HOME | End: 2025-01-08
Attending: INTERNAL MEDICINE
Payer: MEDICARE

## 2025-01-08 DIAGNOSIS — C79.2 SECONDARY MALIGNANT NEOPLASM OF SKIN: ICD-10-CM

## 2025-01-08 DIAGNOSIS — C50.919 CARCINOMA OF BREAST METASTATIC TO BONE, UNSPECIFIED LATERALITY: ICD-10-CM

## 2025-01-08 DIAGNOSIS — Z17.0 MALIGNANT NEOPLASM OF BREAST IN FEMALE, ESTROGEN RECEPTOR POSITIVE, UNSPECIFIED LATERALITY, UNSPECIFIED SITE OF BREAST: ICD-10-CM

## 2025-01-08 DIAGNOSIS — C79.51 CARCINOMA OF BREAST METASTATIC TO BONE, UNSPECIFIED LATERALITY: ICD-10-CM

## 2025-01-08 DIAGNOSIS — C50.919 MALIGNANT NEOPLASM OF BREAST IN FEMALE, ESTROGEN RECEPTOR POSITIVE, UNSPECIFIED LATERALITY, UNSPECIFIED SITE OF BREAST: ICD-10-CM

## 2025-01-08 PROCEDURE — A9552 F18 FDG: HCPCS | Performed by: INTERNAL MEDICINE

## 2025-01-08 PROCEDURE — 78815 PET IMAGE W/CT SKULL-THIGH: CPT | Mod: TC

## 2025-01-08 RX ORDER — FLUDEOXYGLUCOSE F18 500 MCI/ML
10 INJECTION INTRAVENOUS
Status: COMPLETED | OUTPATIENT
Start: 2025-01-08 | End: 2025-01-08

## 2025-01-08 RX ADMIN — FLUDEOXYGLUCOSE F-18 10.3 MILLICURIE: 500 INJECTION INTRAVENOUS at 10:01

## 2025-01-09 NOTE — PROGRESS NOTES
Subjective:       Patient ID: Darby Chavez is a 89 y.o. female.    Chief Complaint: Follow-up (Patient has no concerns today)      Diagnosis:  Right-sided breast cancer in the late 1990s/early 2000s status post mastectomy.  Patient unsure of surgeon's name, unsure of receptor status, did not receive chemotherapy or radiation.   Breast cancer of the left breast 2 years after initial right breast cancer, also status post mastectomy with no chemo or radiation, details unknown.    Metastatic breast cancer of the skin at the site of her left mastectomy scar diagnosed 06/01/2022    Current Treatment:   1. Anastrozole     Treatment History:  1. Right mastectomy in the late 1990s/early 2000s, left mastectomy 2 years later.    HPI:  Patient who was diagnosed with right-sided breast cancer in the late 1990s/early 2000s, status post mastectomy of the right breast.  She did not receive any chemo or radiation. Two years later, she was diagnosed with left-sided breast cancer, also underwent left-sided mastectomy without chemotherapy or radiation.  In 2022, she noticed a nodule/rash at the site of her left mastectomy scar.  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  He was referred Oncology.  I saw her on 07/05/2022.  At that visit, she stated that she has oozing at the site of her metastatic cancer.  She stated that it does not hurt, in only causes problems when she hits it.  I requested that receptor studies be done on her biopsy, this returned ER/MA positive and HER2 negative. PET/CT scan done on 8/8/2022 revealed hypermetabolic left anterior chest wall mass with hypermetabolic left retropectoral lymph nodes and metastasis at the left 11th costovertebral junction and posterior rib.  No other evidence of metastatic disease.  Completed radiation on 09/19/2022.  DEXA scan done on 10/11/2022 showed  osteopenia.  Most recent PET/CT scan on 01/03/2024 showed stable hypermetabolism of the left 11th costovertebral junction with suspected posttraumatic uptake within the T12 vertebral body that has decreased and no new sites of tracer avid metastatic disease. PET/CT scan done on 07/08/2024 showed new FDG activity at T10 vertebral body with mild associated vertebral height loss, not convincingly a pathologic fracture.  Otherwise there was little interval change when compared to prior. Most recent PET/CT scan done on 01/08/2025 showed improved radiotracer activity at T11 with a small focus of residual uptake and sclerosis at the level of the left pedicle which may reflect reactive process, no definitive destructive component identified.  There was no convincing evidence of tracer avid metastasis.    Interval History:   Patient presents to clinic for scheduled follow up appointment.  Overall, she is doing well.  She continues to take anastrozole, she tolerates this well.  She has no major issues to discuss at this time.    Past Medical History:   Diagnosis Date    Anxiety     Arthritis     Breast cancer     CAD (coronary artery disease)     Hyperlipidemia     Hypertension     Mild cognitive impairment     Restless legs syndrome       Past Surgical History:   Procedure Laterality Date    BREAST SURGERY  2000    CATARACT EXTRACTION      COLONOSCOPY      CORONARY STENT PLACEMENT      EYE SURGERY  2018    HYSTERECTOMY      MASTECTOMY Bilateral     TONSILLECTOMY      UTERINE SUSPENSION      WRIST SURGERY Right      Social History     Socioeconomic History    Marital status:    Occupational History    Occupation: Retired   Tobacco Use    Smoking status: Former     Current packs/day: 1.00     Average packs/day: 1 pack/day for 10.0 years (10.0 ttl pk-yrs)     Types: Cigarettes    Smokeless tobacco: Never   Substance and Sexual Activity    Alcohol use: Not Currently    Drug use: Never      Family History   Problem  Relation Name Age of Onset    Breast cancer Maternal Aunt      Breast cancer Maternal Aunt      Heart disease Father Pop       Review of patient's allergies indicates:   Allergen Reactions    Sulfa (sulfonamide antibiotics)      Other reaction(s): itching      Review of Systems   Constitutional:  Negative for appetite change and unexpected weight change.   HENT:  Negative for mouth sores.    Eyes:  Negative for visual disturbance.   Respiratory:  Positive for cough. Negative for shortness of breath.    Cardiovascular:  Negative for chest pain.   Gastrointestinal:  Negative for abdominal pain and diarrhea.   Genitourinary:  Negative for frequency.   Musculoskeletal:  Positive for back pain.   Integumentary:  Negative for rash.   Neurological:  Negative for headaches.   Hematological:  Negative for adenopathy.   Psychiatric/Behavioral:  The patient is not nervous/anxious.          Objective:      Physical Exam  Vitals reviewed.   Constitutional:       General: She is awake.      Appearance: Normal appearance. She is well-developed.   HENT:      Head: Normocephalic and atraumatic.   Eyes:      General: Lids are normal. Vision grossly intact.      Extraocular Movements: Extraocular movements intact.      Conjunctiva/sclera: Conjunctivae normal.   Cardiovascular:      Rate and Rhythm: Normal rate and regular rhythm.      Pulses: Normal pulses.      Heart sounds: Normal heart sounds.   Pulmonary:      Effort: Pulmonary effort is normal.      Breath sounds: Normal breath sounds.   Musculoskeletal:      Cervical back: Full passive range of motion without pain.      Right lower leg: No edema.      Left lower leg: No edema.   Lymphadenopathy:      Cervical: No cervical adenopathy.   Skin:     General: Skin is warm and dry.   Neurological:      General: No focal deficit present.      Mental Status: She is alert and oriented to person, place, and time.   Psychiatric:         Attention and Perception: Attention and perception  normal.         Behavior: Behavior is cooperative.         LABS AND IMAGES REVIEWED IN EPIC          Assessment:     Right-sided breast cancer in the late 1990s/early 2000s status post mastectomy.  Patient unsure of surgeon's name, unsure of receptor status, did not receive chemotherapy or radiation.   Breast cancer of the left breast 2 years after initial right breast cancer, also status post mastectomy with no chemo or radiation, details unknown.    Metastatic breast cancer of the skin at the site of her left mastectomy scar diagnosed 06/01/2022, ER/CT positive, HER2 negative  Osteopenia        Plan:       Patient with history of right-sided breast cancer in the late 1990s/early 2000s with left-sided breast cancer that occurred 2 years later.  She now has recurrence at the site of her left mastectomy scar.    This returned ER/CT positive and HER2 negative.    PET/CT scan done on 08/08/2022 revealed left anterior chest wall mass with left retropectoral nodes and a left 11th rib lesion.  No other metastatic disease.    She was seen by Radiation Oncology for radiation and completed radiation therapy on 09/19/2022.    Patient on anastrozole due to hormone receptor positivity.    We could consider a CDK4/6 inhibitor, but due to her advanced age, we will continue with aromatase inhibitor alone.  PET scan on 01/03/2024 showed stable disease.    DEXA scan done on 10/11/2022 did show osteopenia.  The patient does wear dentures, and these sits directly on her bone.  I worry that if we used a bisphosphonate or denosumab, she would be very high risk for osteonecrosis of the jaw.  She will continue 2000 units of vitamin-D daily and continue calcium supplementation.  Also repeat a DEXA scan in 6 months.    PET/CT scan done on 07/08/2024 showed a new compression fracture with no evidence of new or worsening malignancy.    Most recent PET/CT scan done on 01/08/2025 showed improvement in FDG activity in T11.  There was no new site  of avid disease.    Return to clinic in 6 months with labs and PET/CT scan prior.      Labs:CBC, CMP, CEA, CA 15-3    Kendell Bhatt II, MD I, Thais Ramos LPN, acted solely as a scribe for and in the presence of, Dr. Kendell Bhatt who performed the service.

## 2025-01-10 ENCOUNTER — OFFICE VISIT (OUTPATIENT)
Dept: HEMATOLOGY/ONCOLOGY | Facility: CLINIC | Age: 89
End: 2025-01-10
Payer: MEDICARE

## 2025-01-10 VITALS
OXYGEN SATURATION: 99 % | RESPIRATION RATE: 18 BRPM | SYSTOLIC BLOOD PRESSURE: 163 MMHG | HEART RATE: 86 BPM | WEIGHT: 146 LBS | TEMPERATURE: 98 F | BODY MASS INDEX: 25.87 KG/M2 | HEIGHT: 63 IN | DIASTOLIC BLOOD PRESSURE: 94 MMHG

## 2025-01-10 DIAGNOSIS — Z17.0 MALIGNANT NEOPLASM OF BREAST IN FEMALE, ESTROGEN RECEPTOR POSITIVE, UNSPECIFIED LATERALITY, UNSPECIFIED SITE OF BREAST: ICD-10-CM

## 2025-01-10 DIAGNOSIS — C79.51 CARCINOMA OF LEFT BREAST METASTATIC TO BONE: Primary | ICD-10-CM

## 2025-01-10 DIAGNOSIS — M85.89 OSTEOPENIA OF MULTIPLE SITES: ICD-10-CM

## 2025-01-10 DIAGNOSIS — C50.919 MALIGNANT NEOPLASM OF BREAST IN FEMALE, ESTROGEN RECEPTOR POSITIVE, UNSPECIFIED LATERALITY, UNSPECIFIED SITE OF BREAST: ICD-10-CM

## 2025-01-10 DIAGNOSIS — C79.51 SECONDARY MALIGNANT NEOPLASM OF BONE: ICD-10-CM

## 2025-01-10 DIAGNOSIS — C79.2 SECONDARY MALIGNANT NEOPLASM OF SKIN: ICD-10-CM

## 2025-01-10 DIAGNOSIS — C50.912 CARCINOMA OF LEFT BREAST METASTATIC TO BONE: Primary | ICD-10-CM

## 2025-01-10 PROCEDURE — 99999 PR PBB SHADOW E&M-EST. PATIENT-LVL V: CPT | Mod: PBBFAC,,, | Performed by: INTERNAL MEDICINE

## 2025-03-24 DIAGNOSIS — C50.912 CARCINOMA OF LEFT BREAST METASTATIC TO BONE: ICD-10-CM

## 2025-03-24 DIAGNOSIS — C79.51 CARCINOMA OF LEFT BREAST METASTATIC TO BONE: ICD-10-CM

## 2025-03-24 RX ORDER — ANASTROZOLE 1 MG/1
1 TABLET ORAL
Qty: 90 TABLET | Refills: 1 | Status: SHIPPED | OUTPATIENT
Start: 2025-03-24

## 2025-05-19 DIAGNOSIS — F03.A0 MILD DEMENTIA WITHOUT BEHAVIORAL DISTURBANCE, PSYCHOTIC DISTURBANCE, MOOD DISTURBANCE, OR ANXIETY, UNSPECIFIED DEMENTIA TYPE: ICD-10-CM

## 2025-05-19 RX ORDER — MEMANTINE HYDROCHLORIDE 10 MG/1
10 TABLET ORAL 2 TIMES DAILY
Qty: 60 TABLET | Refills: 5 | Status: SHIPPED | OUTPATIENT
Start: 2025-05-19

## 2025-07-09 ENCOUNTER — HOSPITAL ENCOUNTER (OUTPATIENT)
Dept: RADIOLOGY | Facility: HOSPITAL | Age: 89
Discharge: HOME | End: 2025-07-09
Attending: INTERNAL MEDICINE
Payer: MEDICARE

## 2025-07-09 DIAGNOSIS — C50.912 CARCINOMA OF LEFT BREAST METASTATIC TO BONE: ICD-10-CM

## 2025-07-09 DIAGNOSIS — C79.51 CARCINOMA OF LEFT BREAST METASTATIC TO BONE: ICD-10-CM

## 2025-07-09 PROCEDURE — 78815 PET IMAGE W/CT SKULL-THIGH: CPT | Mod: TC

## 2025-07-09 PROCEDURE — A9552 F18 FDG: HCPCS | Performed by: INTERNAL MEDICINE

## 2025-07-09 RX ORDER — FLUDEOXYGLUCOSE F18 500 MCI/ML
10 INJECTION INTRAVENOUS
Status: COMPLETED | OUTPATIENT
Start: 2025-07-09 | End: 2025-07-09

## 2025-07-09 RX ADMIN — FLUDEOXYGLUCOSE F-18 10.6 MILLICURIE: 500 INJECTION INTRAVENOUS at 08:07

## 2025-07-22 ENCOUNTER — OFFICE VISIT (OUTPATIENT)
Dept: HEMATOLOGY/ONCOLOGY | Facility: CLINIC | Age: 89
End: 2025-07-22
Payer: MEDICARE

## 2025-07-22 VITALS
SYSTOLIC BLOOD PRESSURE: 132 MMHG | HEART RATE: 87 BPM | HEIGHT: 63 IN | WEIGHT: 146 LBS | BODY MASS INDEX: 25.87 KG/M2 | DIASTOLIC BLOOD PRESSURE: 92 MMHG | RESPIRATION RATE: 18 BRPM | OXYGEN SATURATION: 99 % | TEMPERATURE: 98 F

## 2025-07-22 DIAGNOSIS — C50.912 CARCINOMA OF LEFT BREAST METASTATIC TO BONE: ICD-10-CM

## 2025-07-22 DIAGNOSIS — C79.51 CARCINOMA OF LEFT BREAST METASTATIC TO BONE: ICD-10-CM

## 2025-07-22 DIAGNOSIS — C79.51 SECONDARY MALIGNANT NEOPLASM OF BONE: ICD-10-CM

## 2025-07-22 DIAGNOSIS — C50.919 MALIGNANT NEOPLASM OF BREAST IN FEMALE, ESTROGEN RECEPTOR POSITIVE, UNSPECIFIED LATERALITY, UNSPECIFIED SITE OF BREAST: Primary | ICD-10-CM

## 2025-07-22 DIAGNOSIS — Z17.0 MALIGNANT NEOPLASM OF BREAST IN FEMALE, ESTROGEN RECEPTOR POSITIVE, UNSPECIFIED LATERALITY, UNSPECIFIED SITE OF BREAST: Primary | ICD-10-CM

## 2025-07-22 DIAGNOSIS — C79.2 SECONDARY MALIGNANT NEOPLASM OF SKIN: ICD-10-CM

## 2025-07-22 PROCEDURE — 1159F MED LIST DOCD IN RCRD: CPT | Mod: CPTII,S$GLB,, | Performed by: INTERNAL MEDICINE

## 2025-07-22 PROCEDURE — G2211 COMPLEX E/M VISIT ADD ON: HCPCS | Mod: S$GLB,,, | Performed by: INTERNAL MEDICINE

## 2025-07-22 PROCEDURE — 3288F FALL RISK ASSESSMENT DOCD: CPT | Mod: CPTII,S$GLB,, | Performed by: INTERNAL MEDICINE

## 2025-07-22 PROCEDURE — 99999 PR PBB SHADOW E&M-EST. PATIENT-LVL V: CPT | Mod: PBBFAC,,, | Performed by: INTERNAL MEDICINE

## 2025-07-22 PROCEDURE — 99214 OFFICE O/P EST MOD 30 MIN: CPT | Mod: S$GLB,,, | Performed by: INTERNAL MEDICINE

## 2025-07-22 PROCEDURE — 1160F RVW MEDS BY RX/DR IN RCRD: CPT | Mod: CPTII,S$GLB,, | Performed by: INTERNAL MEDICINE

## 2025-07-22 PROCEDURE — 1101F PT FALLS ASSESS-DOCD LE1/YR: CPT | Mod: CPTII,S$GLB,, | Performed by: INTERNAL MEDICINE

## 2025-07-22 NOTE — Clinical Note
Please re-schedule appointments. (Afternoon f/u appts) She needs to return to clinic in 9 months with labs and PET scan prior (schedule PET scan on a Friday)

## 2025-07-23 ENCOUNTER — PATIENT MESSAGE (OUTPATIENT)
Dept: HEMATOLOGY/ONCOLOGY | Facility: CLINIC | Age: 89
End: 2025-07-23
Payer: MEDICARE